# Patient Record
Sex: FEMALE | Race: WHITE | NOT HISPANIC OR LATINO | Employment: OTHER | URBAN - METROPOLITAN AREA
[De-identification: names, ages, dates, MRNs, and addresses within clinical notes are randomized per-mention and may not be internally consistent; named-entity substitution may affect disease eponyms.]

---

## 2017-05-08 ENCOUNTER — HOSPITAL ENCOUNTER (OUTPATIENT)
Dept: FAMILY MEDICINE CLINIC | Facility: CLINIC | Age: 67
Setting detail: SPECIMEN
Discharge: HOME | End: 2017-05-08
Attending: FAMILY MEDICINE | Admitting: FAMILY MEDICINE

## 2017-05-08 LAB
ALBUMIN SERPL-MCNC: 3.5 G/DL (ref 3.5–4.8)
ALBUMIN/GLOB SERPL: 1 {RATIO} (ref 1–1.7)
ALP SERPL-CCNC: 68 IU/L (ref 32–91)
ALT SERPL-CCNC: 21 IU/L (ref 14–54)
ANION GAP SERPL CALC-SCNC: 14.8 MMOL/L (ref 10–20)
AST SERPL-CCNC: 19 IU/L (ref 15–41)
BILIRUB SERPL-MCNC: 0.4 MG/DL (ref 0.3–1.2)
BUN SERPL-MCNC: 20 MG/DL (ref 8–20)
BUN/CREAT SERPL: 20 (ref 5.4–26.2)
CALCIUM SERPL-MCNC: 9.1 MG/DL (ref 8.9–10.3)
CHLORIDE SERPL-SCNC: 104 MMOL/L (ref 101–111)
CHOLEST SERPL-MCNC: 150 MG/DL
CHOLEST/HDLC SERPL: 3.1 {RATIO}
CONV CO2: 27 MMOL/L (ref 22–32)
CONV LDL CHOLESTEROL DIRECT: 85 MG/DL (ref 0–100)
CONV TOTAL PROTEIN: 6.9 G/DL (ref 6.1–7.9)
CREAT UR-MCNC: 1 MG/DL (ref 0.4–1)
GLOBULIN UR ELPH-MCNC: 3.4 G/DL (ref 2.5–3.8)
GLUCOSE SERPL-MCNC: 101 MG/DL (ref 65–99)
HDLC SERPL-MCNC: 48 MG/DL
LDLC/HDLC SERPL: 1.8 {RATIO}
LIPID INTERPRETATION: NORMAL
POTASSIUM SERPL-SCNC: 4.8 MMOL/L (ref 3.6–5.1)
SODIUM SERPL-SCNC: 141 MMOL/L (ref 136–144)
TRIGL SERPL-MCNC: 80 MG/DL
VLDLC SERPL CALC-MCNC: 16.5 MG/DL

## 2017-05-15 ENCOUNTER — HOSPITAL ENCOUNTER (OUTPATIENT)
Dept: MAMMOGRAPHY | Facility: HOSPITAL | Age: 67
Discharge: HOME | End: 2017-05-15
Attending: FAMILY MEDICINE | Admitting: FAMILY MEDICINE

## 2017-11-01 ENCOUNTER — HOSPITAL ENCOUNTER (OUTPATIENT)
Dept: FAMILY MEDICINE CLINIC | Facility: CLINIC | Age: 67
Setting detail: SPECIMEN
Discharge: HOME | End: 2017-11-01
Attending: FAMILY MEDICINE | Admitting: FAMILY MEDICINE

## 2017-11-01 LAB
ALBUMIN SERPL-MCNC: 3.6 G/DL (ref 3.5–4.8)
ALBUMIN/GLOB SERPL: 1 {RATIO} (ref 1–1.7)
ALP SERPL-CCNC: 79 IU/L (ref 32–91)
ALT SERPL-CCNC: 18 IU/L (ref 14–54)
ANION GAP SERPL CALC-SCNC: 13.4 MMOL/L (ref 10–20)
AST SERPL-CCNC: 18 IU/L (ref 15–41)
BASOPHILS # BLD AUTO: 0.1 10*3/UL (ref 0–0.2)
BASOPHILS NFR BLD AUTO: 1 % (ref 0–2)
BILIRUB SERPL-MCNC: 0.4 MG/DL (ref 0.3–1.2)
BUN SERPL-MCNC: 10 MG/DL (ref 8–20)
BUN/CREAT SERPL: 11.1 (ref 5.4–26.2)
CALCIUM SERPL-MCNC: 8.9 MG/DL (ref 8.9–10.3)
CHLORIDE SERPL-SCNC: 103 MMOL/L (ref 101–111)
CHOLEST SERPL-MCNC: 149 MG/DL
CHOLEST/HDLC SERPL: 3.2 {RATIO}
CONV CO2: 27 MMOL/L (ref 22–32)
CONV LDL CHOLESTEROL DIRECT: 95 MG/DL (ref 0–100)
CONV TOTAL PROTEIN: 7.1 G/DL (ref 6.1–7.9)
CREAT UR-MCNC: 0.9 MG/DL (ref 0.4–1)
DIFFERENTIAL METHOD BLD: (no result)
EOSINOPHIL # BLD AUTO: 0.6 10*3/UL (ref 0–0.3)
EOSINOPHIL # BLD AUTO: 6 % (ref 0–3)
ERYTHROCYTE [DISTWIDTH] IN BLOOD BY AUTOMATED COUNT: 16.9 % (ref 11.5–14.5)
GLOBULIN UR ELPH-MCNC: 3.5 G/DL (ref 2.5–3.8)
GLUCOSE SERPL-MCNC: 94 MG/DL (ref 65–99)
HCT VFR BLD AUTO: 34.5 % (ref 35–49)
HDLC SERPL-MCNC: 47 MG/DL
HGB BLD-MCNC: 10.9 G/DL (ref 12–15)
LDLC/HDLC SERPL: 2 {RATIO}
LIPID INTERPRETATION: NORMAL
LYMPHOCYTES # BLD AUTO: 2.5 10*3/UL (ref 0.8–4.8)
LYMPHOCYTES NFR BLD AUTO: 26 % (ref 18–42)
MCH RBC QN AUTO: 25.2 PG (ref 26–32)
MCHC RBC AUTO-ENTMCNC: 31.6 G/DL (ref 32–36)
MCV RBC AUTO: 79.6 FL (ref 80–94)
MONOCYTES # BLD AUTO: 0.7 10*3/UL (ref 0.1–1.3)
MONOCYTES NFR BLD AUTO: 7 % (ref 2–11)
NEUTROPHILS # BLD AUTO: 5.9 10*3/UL (ref 2.3–8.6)
NEUTROPHILS NFR BLD AUTO: 60 % (ref 50–75)
NRBC BLD AUTO-RTO: 0 /100{WBCS}
NRBC/RBC NFR BLD MANUAL: 0 10*3/UL
PLATELET # BLD AUTO: 311 10*3/UL (ref 150–450)
PMV BLD AUTO: 9.1 FL (ref 7.4–10.4)
POTASSIUM SERPL-SCNC: 4.4 MMOL/L (ref 3.6–5.1)
RBC # BLD AUTO: 4.33 10*6/UL (ref 4–5.4)
SODIUM SERPL-SCNC: 139 MMOL/L (ref 136–144)
TRIGL SERPL-MCNC: 93 MG/DL
VLDLC SERPL CALC-MCNC: 7 MG/DL
WBC # BLD AUTO: 9.8 10*3/UL (ref 4.5–11.5)

## 2017-11-10 ENCOUNTER — HOSPITAL ENCOUNTER (OUTPATIENT)
Dept: CARDIOLOGY | Facility: HOSPITAL | Age: 67
Discharge: HOME | End: 2017-11-10
Attending: FAMILY MEDICINE | Admitting: FAMILY MEDICINE

## 2017-11-10 ENCOUNTER — HOSPITAL ENCOUNTER (OUTPATIENT)
Dept: FAMILY MEDICINE CLINIC | Facility: CLINIC | Age: 67
Setting detail: SPECIMEN
Discharge: HOME | End: 2017-11-10
Attending: FAMILY MEDICINE | Admitting: FAMILY MEDICINE

## 2017-11-10 LAB
FOLATE SERPL-MCNC: 21.6 NG/ML (ref 5.9–24.8)
IRON SATN MFR SERPL: 6 % (ref 15–50)
IRON SERPL-MCNC: 24 UG/DL (ref 28–170)
TIBC SERPL-MCNC: 417 UG/DL (ref 228–428)
VIT B12 SERPL-MCNC: 307 PG/ML (ref 180–914)

## 2018-01-05 ENCOUNTER — ON CAMPUS - OUTPATIENT (AMBULATORY)
Dept: URBAN - METROPOLITAN AREA HOSPITAL 2 | Facility: HOSPITAL | Age: 68
End: 2018-01-05

## 2018-01-05 ENCOUNTER — HOSPITAL ENCOUNTER (OUTPATIENT)
Dept: OTHER | Facility: HOSPITAL | Age: 68
Setting detail: SPECIMEN
Discharge: HOME | End: 2018-01-05
Attending: INTERNAL MEDICINE | Admitting: INTERNAL MEDICINE

## 2018-01-05 VITALS
OXYGEN SATURATION: 98 % | SYSTOLIC BLOOD PRESSURE: 132 MMHG | DIASTOLIC BLOOD PRESSURE: 29 MMHG | HEART RATE: 81 BPM | DIASTOLIC BLOOD PRESSURE: 77 MMHG | TEMPERATURE: 97.2 F | HEIGHT: 61 IN | SYSTOLIC BLOOD PRESSURE: 133 MMHG | HEART RATE: 74 BPM | HEART RATE: 82 BPM | SYSTOLIC BLOOD PRESSURE: 142 MMHG | DIASTOLIC BLOOD PRESSURE: 65 MMHG | OXYGEN SATURATION: 95 % | DIASTOLIC BLOOD PRESSURE: 92 MMHG | SYSTOLIC BLOOD PRESSURE: 97 MMHG | RESPIRATION RATE: 20 BRPM | HEART RATE: 70 BPM | HEART RATE: 98 BPM | SYSTOLIC BLOOD PRESSURE: 122 MMHG | SYSTOLIC BLOOD PRESSURE: 115 MMHG | DIASTOLIC BLOOD PRESSURE: 70 MMHG | DIASTOLIC BLOOD PRESSURE: 74 MMHG | SYSTOLIC BLOOD PRESSURE: 179 MMHG | RESPIRATION RATE: 18 BRPM | OXYGEN SATURATION: 96 % | HEART RATE: 84 BPM | HEART RATE: 75 BPM | DIASTOLIC BLOOD PRESSURE: 79 MMHG | SYSTOLIC BLOOD PRESSURE: 92 MMHG | WEIGHT: 215 LBS | OXYGEN SATURATION: 99 %

## 2018-01-05 DIAGNOSIS — D50.0 IRON DEFICIENCY ANEMIA SECONDARY TO BLOOD LOSS (CHRONIC): ICD-10-CM

## 2018-01-05 DIAGNOSIS — K44.9 DIAPHRAGMATIC HERNIA WITHOUT OBSTRUCTION OR GANGRENE: ICD-10-CM

## 2018-01-05 DIAGNOSIS — K57.30 DIVERTICULOSIS OF LARGE INTESTINE WITHOUT PERFORATION OR ABS: ICD-10-CM

## 2018-01-05 DIAGNOSIS — K31.7 POLYP OF STOMACH AND DUODENUM: ICD-10-CM

## 2018-01-05 DIAGNOSIS — Z86.010 PERSONAL HISTORY OF COLONIC POLYPS: ICD-10-CM

## 2018-01-05 PROCEDURE — 43239 EGD BIOPSY SINGLE/MULTIPLE: CPT | Mod: 59 | Performed by: INTERNAL MEDICINE

## 2018-01-05 PROCEDURE — G0105 COLORECTAL SCRN; HI RISK IND: HCPCS | Performed by: INTERNAL MEDICINE

## 2018-01-05 RX ADMIN — PROPOFOL: 10 INJECTION, EMULSION INTRAVENOUS at 08:39

## 2018-01-05 NOTE — SERVICEHPINOTES
FIDEL QUINTEROS  is a  67  female   who presents today for a  EGD-Colonoscopy   for   the indications listed below. The updated Patient Profile was reviewed prior to the procedure, in conjunction with the Physical Exam, including medical conditions, surgical procedures, medications, allergies, family history and social history. See Physical Exam time stamp below for date and time of HPI completion.Pre-operatively, I reviewed the indication(s) for the procedure, the risks of the procedure [including but not limited to: unexpected bleeding possibly requiring hospitalization and/or unplanned repeat procedures, perforation possibly requiring surgical treatment, missed lesions and complications of sedation/MAC (also explained by anesthesia staff)]. I have evaluated the patient for risks associated with the planned anesthesia and the procedure to be performed and find the patient an acceptable candidate for IV sedation.Multiple opportunities were provided for any questions or concerns, and all questions were answered satisfactorily before any anesthesia was administered. We will proceed with the planned procedure.

## 2018-05-10 ENCOUNTER — HOSPITAL ENCOUNTER (OUTPATIENT)
Dept: FAMILY MEDICINE CLINIC | Facility: CLINIC | Age: 68
Setting detail: SPECIMEN
Discharge: HOME | End: 2018-05-10
Attending: FAMILY MEDICINE | Admitting: FAMILY MEDICINE

## 2018-05-10 LAB
ALBUMIN SERPL-MCNC: 3.4 G/DL (ref 3.5–4.8)
ALBUMIN/GLOB SERPL: 1.1 {RATIO} (ref 1–1.7)
ALP SERPL-CCNC: 75 IU/L (ref 32–91)
ALT SERPL-CCNC: 21 IU/L (ref 14–54)
ANION GAP SERPL CALC-SCNC: 14 MMOL/L (ref 10–20)
AST SERPL-CCNC: 20 IU/L (ref 15–41)
BASOPHILS # BLD AUTO: 0.1 10*3/UL (ref 0–0.2)
BASOPHILS NFR BLD AUTO: 1 % (ref 0–2)
BILIRUB SERPL-MCNC: 0.7 MG/DL (ref 0.3–1.2)
BUN SERPL-MCNC: 14 MG/DL (ref 8–20)
BUN/CREAT SERPL: 14 (ref 5.4–26.2)
CALCIUM SERPL-MCNC: 9.1 MG/DL (ref 8.9–10.3)
CHLORIDE SERPL-SCNC: 100 MMOL/L (ref 101–111)
CHOLEST SERPL-MCNC: 140 MG/DL
CHOLEST/HDLC SERPL: 3.5 {RATIO}
CONV CO2: 29 MMOL/L (ref 22–32)
CONV LDL CHOLESTEROL DIRECT: 86 MG/DL (ref 0–100)
CONV TOTAL PROTEIN: 6.5 G/DL (ref 6.1–7.9)
CREAT UR-MCNC: 1 MG/DL (ref 0.4–1)
DIFFERENTIAL METHOD BLD: (no result)
EOSINOPHIL # BLD AUTO: 0.8 10*3/UL (ref 0–0.3)
EOSINOPHIL # BLD AUTO: 9 % (ref 0–3)
ERYTHROCYTE [DISTWIDTH] IN BLOOD BY AUTOMATED COUNT: 16.3 % (ref 11.5–14.5)
GLOBULIN UR ELPH-MCNC: 3.1 G/DL (ref 2.5–3.8)
GLUCOSE SERPL-MCNC: 100 MG/DL (ref 65–99)
HCT VFR BLD AUTO: 36.6 % (ref 35–49)
HDLC SERPL-MCNC: 40 MG/DL
HGB BLD-MCNC: 11.5 G/DL (ref 12–15)
LDLC/HDLC SERPL: 2.2 {RATIO}
LIPID INTERPRETATION: NORMAL
LYMPHOCYTES # BLD AUTO: 2.2 10*3/UL (ref 0.8–4.8)
LYMPHOCYTES NFR BLD AUTO: 24 % (ref 18–42)
MCH RBC QN AUTO: 26.1 PG (ref 26–32)
MCHC RBC AUTO-ENTMCNC: 31.4 G/DL (ref 32–36)
MCV RBC AUTO: 83.2 FL (ref 80–94)
MONOCYTES # BLD AUTO: 0.7 10*3/UL (ref 0.1–1.3)
MONOCYTES NFR BLD AUTO: 8 % (ref 2–11)
NEUTROPHILS # BLD AUTO: 5.2 10*3/UL (ref 2.3–8.6)
NEUTROPHILS NFR BLD AUTO: 58 % (ref 50–75)
NRBC BLD AUTO-RTO: 0 /100{WBCS}
NRBC/RBC NFR BLD MANUAL: 0 10*3/UL
PLATELET # BLD AUTO: 304 10*3/UL (ref 150–450)
PMV BLD AUTO: 10 FL (ref 7.4–10.4)
POTASSIUM SERPL-SCNC: 5 MMOL/L (ref 3.6–5.1)
RBC # BLD AUTO: 4.41 10*6/UL (ref 4–5.4)
SODIUM SERPL-SCNC: 138 MMOL/L (ref 136–144)
TRIGL SERPL-MCNC: 115 MG/DL
VLDLC SERPL CALC-MCNC: 14.6 MG/DL
WBC # BLD AUTO: 9 10*3/UL (ref 4.5–11.5)

## 2018-05-15 ENCOUNTER — CONVERSION ENCOUNTER (OUTPATIENT)
Dept: FAMILY MEDICINE CLINIC | Facility: CLINIC | Age: 68
End: 2018-05-15

## 2018-06-14 ENCOUNTER — HOSPITAL ENCOUNTER (OUTPATIENT)
Dept: MAMMOGRAPHY | Facility: HOSPITAL | Age: 68
Discharge: HOME | End: 2018-06-14
Attending: FAMILY MEDICINE | Admitting: FAMILY MEDICINE

## 2019-06-04 VITALS — SYSTOLIC BLOOD PRESSURE: 144 MMHG | DIASTOLIC BLOOD PRESSURE: 96 MMHG

## 2019-09-12 ENCOUNTER — TRANSCRIBE ORDERS (OUTPATIENT)
Dept: ADMINISTRATIVE | Facility: HOSPITAL | Age: 69
End: 2019-09-12

## 2019-09-12 DIAGNOSIS — Z12.31 SCREENING MAMMOGRAM, ENCOUNTER FOR: Primary | ICD-10-CM

## 2019-09-19 ENCOUNTER — HOSPITAL ENCOUNTER (OUTPATIENT)
Dept: MAMMOGRAPHY | Facility: HOSPITAL | Age: 69
Discharge: HOME | End: 2019-09-19
Admitting: NURSE PRACTITIONER

## 2019-09-19 DIAGNOSIS — Z12.31 SCREENING MAMMOGRAM, ENCOUNTER FOR: ICD-10-CM

## 2019-09-19 PROCEDURE — 77063 BREAST TOMOSYNTHESIS BI: CPT

## 2019-09-19 PROCEDURE — 77067 SCR MAMMO BI INCL CAD: CPT

## 2020-11-08 ENCOUNTER — APPOINTMENT (OUTPATIENT)
Dept: CT IMAGING | Facility: HOSPITAL | Age: 70
End: 2020-11-08

## 2020-11-08 ENCOUNTER — HOSPITAL ENCOUNTER (INPATIENT)
Facility: HOSPITAL | Age: 70
LOS: 3 days | Discharge: HOME | End: 2020-11-11
Attending: EMERGENCY MEDICINE | Admitting: INTERNAL MEDICINE

## 2020-11-08 ENCOUNTER — APPOINTMENT (OUTPATIENT)
Dept: GENERAL RADIOLOGY | Facility: HOSPITAL | Age: 70
End: 2020-11-08

## 2020-11-08 DIAGNOSIS — U07.1 COVID-19: ICD-10-CM

## 2020-11-08 DIAGNOSIS — J18.9 PNEUMONIA OF BOTH LUNGS DUE TO INFECTIOUS ORGANISM, UNSPECIFIED PART OF LUNG: ICD-10-CM

## 2020-11-08 DIAGNOSIS — R06.03 ACUTE RESPIRATORY DISTRESS: Primary | ICD-10-CM

## 2020-11-08 DIAGNOSIS — J44.1 CHRONIC OBSTRUCTIVE PULMONARY DISEASE WITH ACUTE EXACERBATION (HCC): ICD-10-CM

## 2020-11-08 LAB
ALBUMIN SERPL-MCNC: 3.6 G/DL (ref 3.5–5.2)
ALBUMIN/GLOB SERPL: 1.2 G/DL
ALP SERPL-CCNC: 67 U/L (ref 39–117)
ALT SERPL W P-5'-P-CCNC: 34 U/L (ref 1–33)
AMORPH URATE CRY URNS QL MICRO: ABNORMAL /HPF
ANION GAP SERPL CALCULATED.3IONS-SCNC: 15 MMOL/L (ref 5–15)
AST SERPL-CCNC: 35 U/L (ref 1–32)
BACTERIA UR QL AUTO: ABNORMAL /HPF
BASOPHILS # BLD AUTO: 0 10*3/MM3 (ref 0–0.2)
BASOPHILS NFR BLD AUTO: 0.8 % (ref 0–1.5)
BILIRUB SERPL-MCNC: 0.4 MG/DL (ref 0–1.2)
BILIRUB UR QL STRIP: ABNORMAL
BUN SERPL-MCNC: 25 MG/DL (ref 8–23)
BUN/CREAT SERPL: 22.1 (ref 7–25)
CALCIUM SPEC-SCNC: 8.7 MG/DL (ref 8.6–10.5)
CHLORIDE SERPL-SCNC: 95 MMOL/L (ref 98–107)
CLARITY UR: ABNORMAL
CO2 SERPL-SCNC: 24 MMOL/L (ref 22–29)
COLOR UR: ABNORMAL
CREAT SERPL-MCNC: 1.13 MG/DL (ref 0.57–1)
D-LACTATE SERPL-SCNC: 1 MMOL/L (ref 0.5–2)
DEPRECATED RDW RBC AUTO: 44.6 FL (ref 37–54)
EOSINOPHIL # BLD AUTO: 0 10*3/MM3 (ref 0–0.4)
EOSINOPHIL NFR BLD AUTO: 0.2 % (ref 0.3–6.2)
ERYTHROCYTE [DISTWIDTH] IN BLOOD BY AUTOMATED COUNT: 15.8 % (ref 12.3–15.4)
GFR SERPL CREATININE-BSD FRML MDRD: 47 ML/MIN/1.73
GLOBULIN UR ELPH-MCNC: 2.9 GM/DL
GLUCOSE SERPL-MCNC: 89 MG/DL (ref 65–99)
GLUCOSE UR STRIP-MCNC: NEGATIVE MG/DL
HCT VFR BLD AUTO: 39.3 % (ref 34–46.6)
HGB BLD-MCNC: 12.9 G/DL (ref 12–15.9)
HGB UR QL STRIP.AUTO: NEGATIVE
HYALINE CASTS UR QL AUTO: ABNORMAL /LPF
KETONES UR QL STRIP: ABNORMAL
LEUKOCYTE ESTERASE UR QL STRIP.AUTO: NEGATIVE
LIPASE SERPL-CCNC: 100 U/L (ref 13–60)
LYMPHOCYTES # BLD AUTO: 1.1 10*3/MM3 (ref 0.7–3.1)
LYMPHOCYTES NFR BLD AUTO: 24.4 % (ref 19.6–45.3)
MCH RBC QN AUTO: 27.1 PG (ref 26.6–33)
MCHC RBC AUTO-ENTMCNC: 32.9 G/DL (ref 31.5–35.7)
MCV RBC AUTO: 82.2 FL (ref 79–97)
MONOCYTES # BLD AUTO: 0.4 10*3/MM3 (ref 0.1–0.9)
MONOCYTES NFR BLD AUTO: 9.1 % (ref 5–12)
NEUTROPHILS NFR BLD AUTO: 3 10*3/MM3 (ref 1.7–7)
NEUTROPHILS NFR BLD AUTO: 65.5 % (ref 42.7–76)
NITRITE UR QL STRIP: NEGATIVE
NRBC BLD AUTO-RTO: 0 /100 WBC (ref 0–0.2)
PH UR STRIP.AUTO: 5.5 [PH] (ref 5–8)
PLATELET # BLD AUTO: 183 10*3/MM3 (ref 140–450)
PMV BLD AUTO: 9.3 FL (ref 6–12)
POTASSIUM SERPL-SCNC: 3.7 MMOL/L (ref 3.5–5.2)
PROT SERPL-MCNC: 6.5 G/DL (ref 6–8.5)
PROT UR QL STRIP: ABNORMAL
RBC # BLD AUTO: 4.78 10*6/MM3 (ref 3.77–5.28)
RBC # UR: ABNORMAL /HPF
REF LAB TEST METHOD: ABNORMAL
RENAL EPI CELLS #/AREA URNS HPF: ABNORMAL /HPF
SODIUM SERPL-SCNC: 134 MMOL/L (ref 136–145)
SP GR UR STRIP: 1.02 (ref 1–1.03)
SQUAMOUS #/AREA URNS HPF: ABNORMAL /HPF
TROPONIN T SERPL-MCNC: <0.01 NG/ML (ref 0–0.03)
UROBILINOGEN UR QL STRIP: ABNORMAL
WBC # BLD AUTO: 4.6 10*3/MM3 (ref 3.4–10.8)
WBC UR QL AUTO: ABNORMAL /HPF
WHOLE BLOOD HOLD SPECIMEN: NORMAL

## 2020-11-08 PROCEDURE — 93005 ELECTROCARDIOGRAM TRACING: CPT | Performed by: EMERGENCY MEDICINE

## 2020-11-08 PROCEDURE — 83605 ASSAY OF LACTIC ACID: CPT

## 2020-11-08 PROCEDURE — P9612 CATHETERIZE FOR URINE SPEC: HCPCS

## 2020-11-08 PROCEDURE — 25010000002 ONDANSETRON PER 1 MG: Performed by: EMERGENCY MEDICINE

## 2020-11-08 PROCEDURE — 99222 1ST HOSP IP/OBS MODERATE 55: CPT | Performed by: PHYSICIAN ASSISTANT

## 2020-11-08 PROCEDURE — 81001 URINALYSIS AUTO W/SCOPE: CPT | Performed by: EMERGENCY MEDICINE

## 2020-11-08 PROCEDURE — 80053 COMPREHEN METABOLIC PANEL: CPT | Performed by: EMERGENCY MEDICINE

## 2020-11-08 PROCEDURE — 74176 CT ABD & PELVIS W/O CONTRAST: CPT

## 2020-11-08 PROCEDURE — 99285 EMERGENCY DEPT VISIT HI MDM: CPT

## 2020-11-08 PROCEDURE — 71045 X-RAY EXAM CHEST 1 VIEW: CPT

## 2020-11-08 PROCEDURE — 85025 COMPLETE CBC W/AUTO DIFF WBC: CPT | Performed by: EMERGENCY MEDICINE

## 2020-11-08 PROCEDURE — 84484 ASSAY OF TROPONIN QUANT: CPT | Performed by: EMERGENCY MEDICINE

## 2020-11-08 PROCEDURE — 83690 ASSAY OF LIPASE: CPT | Performed by: EMERGENCY MEDICINE

## 2020-11-08 PROCEDURE — 25010000002 AZITHROMYCIN PER 500 MG: Performed by: EMERGENCY MEDICINE

## 2020-11-08 PROCEDURE — 25010000002 DEXAMETHASONE SODIUM PHOSPHATE 10 MG/ML SOLUTION: Performed by: EMERGENCY MEDICINE

## 2020-11-08 PROCEDURE — 87040 BLOOD CULTURE FOR BACTERIA: CPT | Performed by: EMERGENCY MEDICINE

## 2020-11-08 RX ORDER — INSULIN LISPRO 100 [IU]/ML
0-9 INJECTION, SOLUTION INTRAVENOUS; SUBCUTANEOUS
Status: DISCONTINUED | OUTPATIENT
Start: 2020-11-09 | End: 2020-11-11 | Stop reason: HOSPADM

## 2020-11-08 RX ORDER — CLONIDINE HYDROCHLORIDE 0.1 MG/1
0.1 TABLET ORAL DAILY
Status: DISCONTINUED | OUTPATIENT
Start: 2020-11-09 | End: 2020-11-09

## 2020-11-08 RX ORDER — CLONIDINE HYDROCHLORIDE 0.1 MG/1
0.1 TABLET ORAL DAILY
COMMUNITY

## 2020-11-08 RX ORDER — SODIUM CHLORIDE 9 MG/ML
50 INJECTION, SOLUTION INTRAVENOUS CONTINUOUS
Status: DISCONTINUED | OUTPATIENT
Start: 2020-11-08 | End: 2020-11-09

## 2020-11-08 RX ORDER — ALUMINA, MAGNESIA, AND SIMETHICONE 2400; 2400; 240 MG/30ML; MG/30ML; MG/30ML
15 SUSPENSION ORAL EVERY 6 HOURS PRN
Status: DISCONTINUED | OUTPATIENT
Start: 2020-11-08 | End: 2020-11-11 | Stop reason: HOSPADM

## 2020-11-08 RX ORDER — HYDROCHLOROTHIAZIDE 25 MG/1
25 TABLET ORAL DAILY
Status: DISCONTINUED | OUTPATIENT
Start: 2020-11-09 | End: 2020-11-09

## 2020-11-08 RX ORDER — GUAIFENESIN 600 MG/1
1200 TABLET, EXTENDED RELEASE ORAL EVERY 12 HOURS SCHEDULED
Status: DISCONTINUED | OUTPATIENT
Start: 2020-11-09 | End: 2020-11-11 | Stop reason: HOSPADM

## 2020-11-08 RX ORDER — ASPIRIN 81 MG/1
81 TABLET, CHEWABLE ORAL DAILY
Status: DISCONTINUED | OUTPATIENT
Start: 2020-11-09 | End: 2020-11-11 | Stop reason: HOSPADM

## 2020-11-08 RX ORDER — NICOTINE POLACRILEX 4 MG
15 LOZENGE BUCCAL
Status: DISCONTINUED | OUTPATIENT
Start: 2020-11-08 | End: 2020-11-11 | Stop reason: HOSPADM

## 2020-11-08 RX ORDER — ONDANSETRON 4 MG/1
4 TABLET, ORALLY DISINTEGRATING ORAL EVERY 8 HOURS PRN
COMMUNITY

## 2020-11-08 RX ORDER — ATORVASTATIN CALCIUM 40 MG/1
40 TABLET, FILM COATED ORAL DAILY
Status: DISCONTINUED | OUTPATIENT
Start: 2020-11-09 | End: 2020-11-11 | Stop reason: HOSPADM

## 2020-11-08 RX ORDER — BUDESONIDE AND FORMOTEROL FUMARATE DIHYDRATE 160; 4.5 UG/1; UG/1
2 AEROSOL RESPIRATORY (INHALATION)
Status: DISCONTINUED | OUTPATIENT
Start: 2020-11-08 | End: 2020-11-11 | Stop reason: HOSPADM

## 2020-11-08 RX ORDER — ONDANSETRON 4 MG/1
4 TABLET, ORALLY DISINTEGRATING ORAL EVERY 8 HOURS PRN
Status: DISCONTINUED | OUTPATIENT
Start: 2020-11-08 | End: 2020-11-11 | Stop reason: HOSPADM

## 2020-11-08 RX ORDER — ACETAMINOPHEN 325 MG/1
650 TABLET ORAL EVERY 4 HOURS PRN
Status: DISCONTINUED | OUTPATIENT
Start: 2020-11-08 | End: 2020-11-11 | Stop reason: HOSPADM

## 2020-11-08 RX ORDER — ONDANSETRON 2 MG/ML
4 INJECTION INTRAMUSCULAR; INTRAVENOUS ONCE
Status: COMPLETED | OUTPATIENT
Start: 2020-11-08 | End: 2020-11-08

## 2020-11-08 RX ORDER — SODIUM CHLORIDE 0.9 % (FLUSH) 0.9 %
10 SYRINGE (ML) INJECTION AS NEEDED
Status: DISCONTINUED | OUTPATIENT
Start: 2020-11-08 | End: 2020-11-11 | Stop reason: HOSPADM

## 2020-11-08 RX ORDER — IPRATROPIUM BROMIDE AND ALBUTEROL SULFATE 2.5; .5 MG/3ML; MG/3ML
3 SOLUTION RESPIRATORY (INHALATION)
Status: DISCONTINUED | OUTPATIENT
Start: 2020-11-08 | End: 2020-11-09 | Stop reason: ALTCHOICE

## 2020-11-08 RX ORDER — ONDANSETRON 2 MG/ML
4 INJECTION INTRAMUSCULAR; INTRAVENOUS EVERY 6 HOURS PRN
Status: DISCONTINUED | OUTPATIENT
Start: 2020-11-08 | End: 2020-11-11 | Stop reason: HOSPADM

## 2020-11-08 RX ORDER — MAGNESIUM SULFATE 1 G/100ML
1 INJECTION INTRAVENOUS AS NEEDED
Status: DISCONTINUED | OUTPATIENT
Start: 2020-11-08 | End: 2020-11-11 | Stop reason: HOSPADM

## 2020-11-08 RX ORDER — BISACODYL 10 MG
10 SUPPOSITORY, RECTAL RECTAL DAILY PRN
Status: DISCONTINUED | OUTPATIENT
Start: 2020-11-08 | End: 2020-11-11 | Stop reason: HOSPADM

## 2020-11-08 RX ORDER — ONDANSETRON 4 MG/1
4 TABLET, FILM COATED ORAL EVERY 6 HOURS PRN
Status: DISCONTINUED | OUTPATIENT
Start: 2020-11-08 | End: 2020-11-11 | Stop reason: HOSPADM

## 2020-11-08 RX ORDER — METFORMIN HYDROCHLORIDE 500 MG/1
1000 TABLET, EXTENDED RELEASE ORAL
COMMUNITY

## 2020-11-08 RX ORDER — MAGNESIUM SULFATE HEPTAHYDRATE 40 MG/ML
2 INJECTION, SOLUTION INTRAVENOUS AS NEEDED
Status: DISCONTINUED | OUTPATIENT
Start: 2020-11-08 | End: 2020-11-11 | Stop reason: HOSPADM

## 2020-11-08 RX ORDER — DEXAMETHASONE SODIUM PHOSPHATE 10 MG/ML
6 INJECTION, SOLUTION INTRAMUSCULAR; INTRAVENOUS ONCE
Status: COMPLETED | OUTPATIENT
Start: 2020-11-08 | End: 2020-11-08

## 2020-11-08 RX ORDER — OMEPRAZOLE 40 MG/1
40 CAPSULE, DELAYED RELEASE ORAL DAILY
COMMUNITY

## 2020-11-08 RX ORDER — DEXTROSE MONOHYDRATE 25 G/50ML
25 INJECTION, SOLUTION INTRAVENOUS
Status: DISCONTINUED | OUTPATIENT
Start: 2020-11-08 | End: 2020-11-11 | Stop reason: HOSPADM

## 2020-11-08 RX ORDER — HYDROCHLOROTHIAZIDE 25 MG/1
25 TABLET ORAL DAILY
COMMUNITY
End: 2020-11-11 | Stop reason: HOSPADM

## 2020-11-08 RX ORDER — CANDESARTAN 32 MG/1
32 TABLET ORAL DAILY
COMMUNITY
End: 2020-11-11 | Stop reason: HOSPADM

## 2020-11-08 RX ORDER — ACETAMINOPHEN 650 MG/1
650 SUPPOSITORY RECTAL EVERY 4 HOURS PRN
Status: DISCONTINUED | OUTPATIENT
Start: 2020-11-08 | End: 2020-11-11 | Stop reason: HOSPADM

## 2020-11-08 RX ORDER — PANTOPRAZOLE SODIUM 40 MG/1
40 TABLET, DELAYED RELEASE ORAL
Status: DISCONTINUED | OUTPATIENT
Start: 2020-11-09 | End: 2020-11-11 | Stop reason: HOSPADM

## 2020-11-08 RX ORDER — ATORVASTATIN CALCIUM 40 MG/1
40 TABLET, FILM COATED ORAL DAILY
COMMUNITY

## 2020-11-08 RX ORDER — BUDESONIDE AND FORMOTEROL FUMARATE DIHYDRATE 160; 4.5 UG/1; UG/1
2 AEROSOL RESPIRATORY (INHALATION)
COMMUNITY

## 2020-11-08 RX ORDER — SODIUM CHLORIDE 0.9 % (FLUSH) 0.9 %
10 SYRINGE (ML) INJECTION EVERY 12 HOURS SCHEDULED
Status: DISCONTINUED | OUTPATIENT
Start: 2020-11-08 | End: 2020-11-11 | Stop reason: HOSPADM

## 2020-11-08 RX ORDER — INSULIN LISPRO 100 [IU]/ML
0-9 INJECTION, SOLUTION INTRAVENOUS; SUBCUTANEOUS AS NEEDED
Status: DISCONTINUED | OUTPATIENT
Start: 2020-11-08 | End: 2020-11-11 | Stop reason: HOSPADM

## 2020-11-08 RX ORDER — CHOLECALCIFEROL (VITAMIN D3) 125 MCG
5 CAPSULE ORAL NIGHTLY PRN
Status: DISCONTINUED | OUTPATIENT
Start: 2020-11-08 | End: 2020-11-11 | Stop reason: HOSPADM

## 2020-11-08 RX ORDER — SODIUM CHLORIDE 9 MG/ML
75 INJECTION, SOLUTION INTRAVENOUS CONTINUOUS
Status: DISCONTINUED | OUTPATIENT
Start: 2020-11-08 | End: 2020-11-09

## 2020-11-08 RX ORDER — ACETAMINOPHEN 160 MG/5ML
650 SOLUTION ORAL EVERY 4 HOURS PRN
Status: DISCONTINUED | OUTPATIENT
Start: 2020-11-08 | End: 2020-11-11 | Stop reason: HOSPADM

## 2020-11-08 RX ORDER — AZITHROMYCIN 250 MG/1
250 TABLET, FILM COATED ORAL DAILY
COMMUNITY
Start: 2020-11-04 | End: 2020-11-11 | Stop reason: HOSPADM

## 2020-11-08 RX ORDER — POTASSIUM CHLORIDE 20 MEQ/1
40 TABLET, EXTENDED RELEASE ORAL AS NEEDED
Status: DISCONTINUED | OUTPATIENT
Start: 2020-11-08 | End: 2020-11-11 | Stop reason: HOSPADM

## 2020-11-08 RX ORDER — ALBUTEROL SULFATE 90 UG/1
2 AEROSOL, METERED RESPIRATORY (INHALATION)
Status: DISCONTINUED | OUTPATIENT
Start: 2020-11-08 | End: 2020-11-11 | Stop reason: HOSPADM

## 2020-11-08 RX ORDER — BENZONATATE 100 MG/1
200 CAPSULE ORAL 3 TIMES DAILY PRN
Status: DISCONTINUED | OUTPATIENT
Start: 2020-11-08 | End: 2020-11-11 | Stop reason: HOSPADM

## 2020-11-08 RX ORDER — DEXAMETHASONE SODIUM PHOSPHATE 10 MG/ML
6 INJECTION, SOLUTION INTRAMUSCULAR; INTRAVENOUS DAILY
Status: DISCONTINUED | OUTPATIENT
Start: 2020-11-09 | End: 2020-11-10

## 2020-11-08 RX ORDER — ASPIRIN 81 MG/1
81 TABLET, CHEWABLE ORAL DAILY
COMMUNITY

## 2020-11-08 RX ORDER — NITROGLYCERIN 0.4 MG/1
0.4 TABLET SUBLINGUAL
Status: DISCONTINUED | OUTPATIENT
Start: 2020-11-08 | End: 2020-11-11 | Stop reason: HOSPADM

## 2020-11-08 RX ORDER — AZITHROMYCIN 250 MG/1
500 TABLET, FILM COATED ORAL EVERY 24 HOURS
Status: DISCONTINUED | OUTPATIENT
Start: 2020-11-09 | End: 2020-11-11 | Stop reason: HOSPADM

## 2020-11-08 RX ORDER — VALSARTAN 80 MG/1
320 TABLET ORAL
Status: DISCONTINUED | OUTPATIENT
Start: 2020-11-09 | End: 2020-11-09

## 2020-11-08 RX ADMIN — DEXAMETHASONE SODIUM PHOSPHATE 6 MG: 10 INJECTION, SOLUTION INTRAMUSCULAR; INTRAVENOUS at 19:28

## 2020-11-08 RX ADMIN — SODIUM CHLORIDE 500 MG: 900 INJECTION, SOLUTION INTRAVENOUS at 19:28

## 2020-11-08 RX ADMIN — ONDANSETRON HYDROCHLORIDE 4 MG: 2 SOLUTION INTRAMUSCULAR; INTRAVENOUS at 13:46

## 2020-11-08 RX ADMIN — SODIUM CHLORIDE 1000 ML: 9 INJECTION, SOLUTION INTRAVENOUS at 13:46

## 2020-11-08 RX ADMIN — SODIUM CHLORIDE 1000 ML: 9 INJECTION, SOLUTION INTRAVENOUS at 18:06

## 2020-11-08 NOTE — ED NOTES
Pt c/o n/v/d, dizziness, cough, SOA since Monday. Seen at [facility] ER Wednesday and diagnosed with COVID. Pt states her symptoms are getting worse.     Armida Lopez RN  11/08/20 1524

## 2020-11-08 NOTE — ED NOTES
Pt c/o feeling dizzy then reported an urge to defecate. Pt became unresponsive with eyes open, BP dropped to 72/51 and HR decreased to 52. Pt was diaphoretic and nauseous. Symptoms lasted 20 seconds then pt able to respond verbally and BP then increased to 92/52. HR 80s NSR. IVF infusing, EKG obtained at bedside.      Armida Lopez RN  11/08/20 3568       Armida Lopez RN  11/08/20 3819

## 2020-11-08 NOTE — ED PROVIDER NOTES
Subjective   Complaint shortness of breath nausea vomiting cannot hold anything down    History of present illness this is a 70-year-old female with history of COPD states that she has been feeling fatigued and had a cough and congestion throughout the week she tested positive for COVID-19 on Wednesday and she has been vomiting the last couple days and cannot hold anything down.  She has some mild shortness of breath.  No chest pain neck arm or jaw pain symptoms are moderate worse with any activity and better with rest and ongoing this week but vomiting last couple days.  No one at home with similar illness.  No urinary problems.          Review of Systems   Constitutional: Positive for chills. Negative for fever.   HENT: Positive for congestion. Negative for sinus pressure.    Eyes: Negative for photophobia and visual disturbance.   Respiratory: Positive for cough and chest tightness.    Cardiovascular: Negative for chest pain and leg swelling.   Gastrointestinal: Positive for vomiting. Negative for abdominal pain.   Endocrine: Negative for cold intolerance and heat intolerance.   Genitourinary: Negative for difficulty urinating and dysuria.   Musculoskeletal: Negative for arthralgias and back pain.   Skin: Negative for color change and pallor.   Neurological: Positive for weakness. Negative for dizziness and light-headedness.   Psychiatric/Behavioral: Negative for agitation and behavioral problems.       No past medical history on file.  COPD diabetes  Allergies   Allergen Reactions   • Sulfa Antibiotics Anaphylaxis       No past surgical history on file.    No family history on file.    Social History     Socioeconomic History   • Marital status:      Spouse name: Not on file   • Number of children: Not on file   • Years of education: Not on file   • Highest education level: Not on file     Prior to Admission medications    Medication Sig Start Date End Date Taking? Authorizing Provider   aspirin 81 MG  chewable tablet Chew 81 mg Daily.   Yes J Luis Dash MD   atorvastatin (LIPITOR) 40 MG tablet Take 40 mg by mouth Daily.   Yes J Luis Dash MD   azithromycin (ZITHROMAX) 250 MG tablet Take 250 mg by mouth Daily. For 5 days 11/4/20 11/9/20 Yes J Luis Dash MD   budesonide-formoterol (SYMBICORT) 160-4.5 MCG/ACT inhaler Inhale 2 puffs 2 (Two) Times a Day.   Yes J Luis Dash MD   candesartan (ATACAND) 32 MG tablet Take 32 mg by mouth Daily.   Yes J Luis Dash MD   cloNIDine (CATAPRES) 0.1 MG tablet Take 0.1 mg by mouth Daily.   Yes J Luis Dash MD   hydroCHLOROthiazide (HYDRODIURIL) 25 MG tablet Take 25 mg by mouth Daily.   Yes J Luis Dash MD   metFORMIN ER (GLUCOPHAGE-XR) 500 MG 24 hr tablet Take 1,000 mg by mouth Daily With Breakfast.   Yes J Luis Dash MD   omeprazole (priLOSEC) 40 MG capsule Take 40 mg by mouth Daily.   Yes J Luis Dash MD   ondansetron ODT (ZOFRAN-ODT) 4 MG disintegrating tablet Place 4 mg on the tongue Every 8 (Eight) Hours As Needed for Nausea or Vomiting.   Yes J Luis Dash MD           Objective   Physical Exam  70-year-old awake alert no acute distress but sats are 91% on room air HEENT extraocular static pupils equal and reactive mouth is clear neck supple no adenopathy no JVD no bruits lungs scattered rhonchi and wheezes no retractions heart regular without murmur abdomen soft without tenderness no pulsatile masses extremities pulses are equal throughout upper and lower extremities no edema cords or Homans' sign or evidence of DVT.  Patient's awake alert follows commands motor strength normal without focal weakness  Procedures           ED Course      Results for orders placed or performed during the hospital encounter of 11/08/20   Comprehensive Metabolic Panel    Specimen: Arm, Left; Blood   Result Value Ref Range    Glucose 89 65 - 99 mg/dL    BUN 25 (H) 8 - 23 mg/dL    Creatinine 1.13 (H) 0.57 -  1.00 mg/dL    Sodium 134 (L) 136 - 145 mmol/L    Potassium 3.7 3.5 - 5.2 mmol/L    Chloride 95 (L) 98 - 107 mmol/L    CO2 24.0 22.0 - 29.0 mmol/L    Calcium 8.7 8.6 - 10.5 mg/dL    Total Protein 6.5 6.0 - 8.5 g/dL    Albumin 3.60 3.50 - 5.20 g/dL    ALT (SGPT) 34 (H) 1 - 33 U/L    AST (SGOT) 35 (H) 1 - 32 U/L    Alkaline Phosphatase 67 39 - 117 U/L    Total Bilirubin 0.4 0.0 - 1.2 mg/dL    eGFR Non African Amer 47 (L) >60 mL/min/1.73    Globulin 2.9 gm/dL    A/G Ratio 1.2 g/dL    BUN/Creatinine Ratio 22.1 7.0 - 25.0    Anion Gap 15.0 5.0 - 15.0 mmol/L   Lipase    Specimen: Arm, Left; Blood   Result Value Ref Range    Lipase 100 (H) 13 - 60 U/L   Urinalysis With Microscopic If Indicated (No Culture) - Urine, Catheter    Specimen: Urine, Catheter   Result Value Ref Range    Color, UA Dark Yellow (A) Yellow, Straw    Appearance, UA Cloudy (A) Clear    pH, UA 5.5 5.0 - 8.0    Specific Gravity, UA 1.023 1.005 - 1.030    Glucose, UA Negative Negative    Ketones, UA Trace (A) Negative    Bilirubin, UA Small (1+) (A) Negative    Blood, UA Negative Negative    Protein,  mg/dL (2+) (A) Negative    Leuk Esterase, UA Negative Negative    Nitrite, UA Negative Negative    Urobilinogen, UA 0.2 E.U./dL 0.2 - 1.0 E.U./dL   Troponin    Specimen: Arm, Left; Blood   Result Value Ref Range    Troponin T <0.010 0.000 - 0.030 ng/mL   CBC Auto Differential    Specimen: Arm, Left; Blood   Result Value Ref Range    WBC 4.60 3.40 - 10.80 10*3/mm3    RBC 4.78 3.77 - 5.28 10*6/mm3    Hemoglobin 12.9 12.0 - 15.9 g/dL    Hematocrit 39.3 34.0 - 46.6 %    MCV 82.2 79.0 - 97.0 fL    MCH 27.1 26.6 - 33.0 pg    MCHC 32.9 31.5 - 35.7 g/dL    RDW 15.8 (H) 12.3 - 15.4 %    RDW-SD 44.6 37.0 - 54.0 fl    MPV 9.3 6.0 - 12.0 fL    Platelets 183 140 - 450 10*3/mm3    Neutrophil % 65.5 42.7 - 76.0 %    Lymphocyte % 24.4 19.6 - 45.3 %    Monocyte % 9.1 5.0 - 12.0 %    Eosinophil % 0.2 (L) 0.3 - 6.2 %    Basophil % 0.8 0.0 - 1.5 %    Neutrophils, Absolute  3.00 1.70 - 7.00 10*3/mm3    Lymphocytes, Absolute 1.10 0.70 - 3.10 10*3/mm3    Monocytes, Absolute 0.40 0.10 - 0.90 10*3/mm3    Eosinophils, Absolute 0.00 0.00 - 0.40 10*3/mm3    Basophils, Absolute 0.00 0.00 - 0.20 10*3/mm3    nRBC 0.0 0.0 - 0.2 /100 WBC   Urinalysis, Microscopic Only - Urine, Catheter    Specimen: Urine, Catheter   Result Value Ref Range    RBC, UA 0-2 (A) None Seen /HPF    WBC, UA 6-12 (A) None Seen /HPF    Bacteria, UA None Seen None Seen /HPF    Squamous Epithelial Cells, UA 0-2 None Seen, 0-2 /HPF    Renal Epithelial Cells, UA 3-6 (A) 0 - 2 /HPF    Hyaline Casts, UA 13-20 None Seen /LPF    Amorphous Crystals, UA Small/1+ None Seen /HPF    Methodology Manual Light Microscopy    POC Lactate    Specimen: Blood   Result Value Ref Range    Lactate 1.0 0.5 - 2.0 mmol/L   ECG 12 Lead   Result Value Ref Range    QT Interval 358 ms   Light Blue Top   Result Value Ref Range    Extra Tube hold for add-on      Ct Abdomen Pelvis Without Contrast    Result Date: 11/8/2020  1.No acute process identified within abdomen/pelivs. 2.Within the visualized lung bases are a couple scattered groundglass densities, suggesting a mild infectious or inflammatory process.    Electronically Signed By-DR. Rodney Escudero MD On:11/8/2020 4:01 PM This report was finalized on [id number] by DR. Rodney Escudero MD.    Xr Chest 1 View    Result Date: 11/8/2020  No acute cardiopulmonary process identified.  Electronically Signed By-DR. Rodney Escudero MD On:11/8/2020 1:43 PM This report was finalized on [id number] by DR. Rodney Escudero MD.    Medications   sodium chloride 0.9 % flush 10 mL (has no administration in time range)   sodium chloride 0.9 % bolus 1,000 mL (1,000 mL Intravenous New Bag 11/8/20 1806)   sodium chloride 0.9 % bolus 1,000 mL (0 mL Intravenous Stopped 11/8/20 1518)   ondansetron (ZOFRAN) injection 4 mg (4 mg Intravenous Given 11/8/20 1346)            EKG my interpretation normal sinus rhythm rate  84 normal axis hypertrophy  normal EKG                                MDM  Number of Diagnoses or Management Options  Acute respiratory distress:   Chronic obstructive pulmonary disease with acute exacerbation (CMS/HCC):   COVID-19:   Pneumonia of both lungs due to infectious organism, unspecified part of lung:   Diagnosis management comments: Medical decision making.  Patient IV established placed on monitor of saline Zofran 4 mg IV.  Patient had the above exam evaluation chest x-ray unremarkable EKG without acute findings CT abdomen pelvis showed no acute intra-abdominal process but showed groundglass opacities in the lower lobes.  CBC unremarkable lactate was 1 urine was unremarkable troponin negative chemistries BUN of 25 creatinine 1.13.  The patient was given Decadron 6 mg IV Zithromax 500 mg IV.  Patient sats about 91% on room air she has COPD she is vomiting cannot hold anything down and she has shortness of breath and sats are 91% and some pneumonia and COVID-19.  Patient made aware the findings a hospitalist notified the patient be placed in hospital for further care stable unremarkable improved ER course.  Patient examined appropriate PPE per hospital protocol       Amount and/or Complexity of Data Reviewed  Clinical lab tests: reviewed  Tests in the radiology section of CPT®: reviewed  Tests in the medicine section of CPT®: reviewed        Final diagnoses:   Acute respiratory distress   Chronic obstructive pulmonary disease with acute exacerbation (CMS/HCC)   Pneumonia of both lungs due to infectious organism, unspecified part of lung   COVID-19            Augustus Frey MD  11/08/20 1902       Augustus Frey MD  11/08/20 1923

## 2020-11-09 PROBLEM — E66.9 OBESITY (BMI 30-39.9): Chronic | Status: ACTIVE | Noted: 2020-11-09

## 2020-11-09 PROBLEM — K21.9 GASTROESOPHAGEAL REFLUX DISEASE: Status: ACTIVE | Noted: 2020-11-09

## 2020-11-09 PROBLEM — E11.9 DIABETES MELLITUS (HCC): Status: ACTIVE | Noted: 2018-07-02

## 2020-11-09 PROBLEM — N18.30 CKD (CHRONIC KIDNEY DISEASE), STAGE III (HCC): Chronic | Status: ACTIVE | Noted: 2020-11-09

## 2020-11-09 PROBLEM — E78.5 HYPERLIPIDEMIA: Chronic | Status: ACTIVE | Noted: 2018-07-02

## 2020-11-09 PROBLEM — I95.9 HYPOTENSION: Status: ACTIVE | Noted: 2020-11-09

## 2020-11-09 PROBLEM — I10 HYPERTENSION: Status: ACTIVE | Noted: 2018-07-02

## 2020-11-09 PROBLEM — I25.10 CAD (CORONARY ARTERY DISEASE): Chronic | Status: ACTIVE | Noted: 2020-11-09

## 2020-11-09 PROBLEM — E78.5 HYPERLIPIDEMIA: Status: ACTIVE | Noted: 2018-07-02

## 2020-11-09 PROBLEM — K21.9 GASTROESOPHAGEAL REFLUX DISEASE: Chronic | Status: ACTIVE | Noted: 2020-11-09

## 2020-11-09 PROBLEM — J45.909 ASTHMA: Status: ACTIVE | Noted: 2020-11-09

## 2020-11-09 PROBLEM — G25.81 RESTLESS LEGS: Status: ACTIVE | Noted: 2020-10-19

## 2020-11-09 PROBLEM — U07.1 COVID-19 VIRUS INFECTION: Status: ACTIVE | Noted: 2020-11-09

## 2020-11-09 PROBLEM — D64.9 ANEMIA: Status: ACTIVE | Noted: 2017-11-02

## 2020-11-09 PROBLEM — R11.2 NAUSEA AND VOMITING: Status: ACTIVE | Noted: 2020-11-09

## 2020-11-09 PROBLEM — I10 HYPERTENSION: Chronic | Status: ACTIVE | Noted: 2018-07-02

## 2020-11-09 PROBLEM — R74.8 ELEVATED LIPASE: Status: ACTIVE | Noted: 2020-11-09

## 2020-11-09 PROBLEM — E11.9 DIABETES MELLITUS (HCC): Chronic | Status: ACTIVE | Noted: 2018-07-02

## 2020-11-09 LAB
ABO GROUP BLD: NORMAL
ANION GAP SERPL CALCULATED.3IONS-SCNC: 13 MMOL/L (ref 5–15)
B PARAPERT DNA SPEC QL NAA+PROBE: NOT DETECTED
B PERT DNA SPEC QL NAA+PROBE: NOT DETECTED
BASOPHILS # BLD AUTO: 0 10*3/MM3 (ref 0–0.2)
BASOPHILS NFR BLD AUTO: 0.6 % (ref 0–1.5)
BLD GP AB SCN SERPL QL: NEGATIVE
BUN SERPL-MCNC: 21 MG/DL (ref 8–23)
BUN/CREAT SERPL: 30.9 (ref 7–25)
C PNEUM DNA NPH QL NAA+NON-PROBE: NOT DETECTED
CALCIUM SPEC-SCNC: 8.1 MG/DL (ref 8.6–10.5)
CHLORIDE SERPL-SCNC: 96 MMOL/L (ref 98–107)
CO2 SERPL-SCNC: 23 MMOL/L (ref 22–29)
CREAT SERPL-MCNC: 0.68 MG/DL (ref 0.57–1)
CRP SERPL-MCNC: 3.93 MG/DL (ref 0–0.5)
CRP SERPL-MCNC: 4.07 MG/DL (ref 0–0.5)
D DIMER PPP FEU-MCNC: 0.29 MG/L (FEU) (ref 0–0.59)
D DIMER PPP FEU-MCNC: 0.52 MG/L (FEU) (ref 0–0.59)
DEPRECATED RDW RBC AUTO: 46.8 FL (ref 37–54)
EOSINOPHIL # BLD AUTO: 0 10*3/MM3 (ref 0–0.4)
EOSINOPHIL NFR BLD AUTO: 0.3 % (ref 0.3–6.2)
ERYTHROCYTE [DISTWIDTH] IN BLOOD BY AUTOMATED COUNT: 16.3 % (ref 12.3–15.4)
FERRITIN SERPL-MCNC: 220.5 NG/ML (ref 13–150)
FERRITIN SERPL-MCNC: 226.4 NG/ML (ref 13–150)
FLUAV H1 2009 PAND RNA NPH QL NAA+PROBE: NOT DETECTED
FLUAV H1 HA GENE NPH QL NAA+PROBE: NOT DETECTED
FLUAV H3 RNA NPH QL NAA+PROBE: NOT DETECTED
FLUAV SUBTYP SPEC NAA+PROBE: NOT DETECTED
FLUBV RNA ISLT QL NAA+PROBE: NOT DETECTED
GFR SERPL CREATININE-BSD FRML MDRD: 85 ML/MIN/1.73
GLUCOSE BLDC GLUCOMTR-MCNC: 143 MG/DL (ref 70–105)
GLUCOSE BLDC GLUCOMTR-MCNC: 206 MG/DL (ref 70–105)
GLUCOSE BLDC GLUCOMTR-MCNC: 86 MG/DL (ref 70–105)
GLUCOSE SERPL-MCNC: 130 MG/DL (ref 65–99)
HADV DNA SPEC NAA+PROBE: NOT DETECTED
HBA1C MFR BLD: 6.4 % (ref 3.5–5.6)
HCOV 229E RNA SPEC QL NAA+PROBE: NOT DETECTED
HCOV HKU1 RNA SPEC QL NAA+PROBE: NOT DETECTED
HCOV NL63 RNA SPEC QL NAA+PROBE: NOT DETECTED
HCOV OC43 RNA SPEC QL NAA+PROBE: NOT DETECTED
HCT VFR BLD AUTO: 36.1 % (ref 34–46.6)
HGB BLD-MCNC: 11.7 G/DL (ref 12–15.9)
HMPV RNA NPH QL NAA+NON-PROBE: NOT DETECTED
HPIV1 RNA SPEC QL NAA+PROBE: NOT DETECTED
HPIV2 RNA SPEC QL NAA+PROBE: NOT DETECTED
HPIV3 RNA NPH QL NAA+PROBE: NOT DETECTED
HPIV4 P GENE NPH QL NAA+PROBE: NOT DETECTED
L PNEUMO1 AG UR QL IA: NEGATIVE
LDH SERPL-CCNC: 265 U/L (ref 135–214)
LDH SERPL-CCNC: 301 U/L (ref 135–214)
LYMPHOCYTES # BLD AUTO: 0.8 10*3/MM3 (ref 0.7–3.1)
LYMPHOCYTES NFR BLD AUTO: 36.6 % (ref 19.6–45.3)
M PNEUMO IGG SER IA-ACNC: NOT DETECTED
MAGNESIUM SERPL-MCNC: 1.5 MG/DL (ref 1.6–2.4)
MCH RBC QN AUTO: 26.5 PG (ref 26.6–33)
MCHC RBC AUTO-ENTMCNC: 32.4 G/DL (ref 31.5–35.7)
MCV RBC AUTO: 81.8 FL (ref 79–97)
MONOCYTES # BLD AUTO: 0.1 10*3/MM3 (ref 0.1–0.9)
MONOCYTES NFR BLD AUTO: 6 % (ref 5–12)
NEUTROPHILS NFR BLD AUTO: 1.2 10*3/MM3 (ref 1.7–7)
NEUTROPHILS NFR BLD AUTO: 56.5 % (ref 42.7–76)
NRBC BLD AUTO-RTO: 0.3 /100 WBC (ref 0–0.2)
PLATELET # BLD AUTO: 167 10*3/MM3 (ref 140–450)
PMV BLD AUTO: 9 FL (ref 6–12)
POTASSIUM SERPL-SCNC: 3.6 MMOL/L (ref 3.5–5.2)
PROCALCITONIN SERPL-MCNC: 0.07 NG/ML (ref 0–0.25)
RBC # BLD AUTO: 4.42 10*6/MM3 (ref 3.77–5.28)
RH BLD: POSITIVE
RHINOVIRUS RNA SPEC NAA+PROBE: NOT DETECTED
RSV RNA NPH QL NAA+NON-PROBE: NOT DETECTED
S PNEUM AG SPEC QL LA: NEGATIVE
SARS-COV-2 RNA NPH QL NAA+NON-PROBE: DETECTED
SODIUM SERPL-SCNC: 132 MMOL/L (ref 136–145)
T&S EXPIRATION DATE: NORMAL
WBC # BLD AUTO: 2.1 10*3/MM3 (ref 3.4–10.8)

## 2020-11-09 PROCEDURE — 87899 AGENT NOS ASSAY W/OPTIC: CPT | Performed by: PHYSICIAN ASSISTANT

## 2020-11-09 PROCEDURE — 86900 BLOOD TYPING SEROLOGIC ABO: CPT

## 2020-11-09 PROCEDURE — 83735 ASSAY OF MAGNESIUM: CPT | Performed by: PHYSICIAN ASSISTANT

## 2020-11-09 PROCEDURE — 84145 PROCALCITONIN (PCT): CPT | Performed by: PHYSICIAN ASSISTANT

## 2020-11-09 PROCEDURE — 82728 ASSAY OF FERRITIN: CPT | Performed by: PHYSICIAN ASSISTANT

## 2020-11-09 PROCEDURE — 63710000001 INSULIN GLARGINE PER 5 UNITS: Performed by: INTERNAL MEDICINE

## 2020-11-09 PROCEDURE — 86901 BLOOD TYPING SEROLOGIC RH(D): CPT | Performed by: PHYSICIAN ASSISTANT

## 2020-11-09 PROCEDURE — 0202U NFCT DS 22 TRGT SARS-COV-2: CPT | Performed by: PHYSICIAN ASSISTANT

## 2020-11-09 PROCEDURE — 25010000002 ENOXAPARIN PER 10 MG: Performed by: PHYSICIAN ASSISTANT

## 2020-11-09 PROCEDURE — 25010000002 DEXAMETHASONE SODIUM PHOSPHATE 10 MG/ML SOLUTION: Performed by: PHYSICIAN ASSISTANT

## 2020-11-09 PROCEDURE — 83615 LACTATE (LD) (LDH) ENZYME: CPT | Performed by: PHYSICIAN ASSISTANT

## 2020-11-09 PROCEDURE — 86850 RBC ANTIBODY SCREEN: CPT | Performed by: PHYSICIAN ASSISTANT

## 2020-11-09 PROCEDURE — 82962 GLUCOSE BLOOD TEST: CPT

## 2020-11-09 PROCEDURE — 94799 UNLISTED PULMONARY SVC/PX: CPT

## 2020-11-09 PROCEDURE — 85379 FIBRIN DEGRADATION QUANT: CPT | Performed by: PHYSICIAN ASSISTANT

## 2020-11-09 PROCEDURE — 99233 SBSQ HOSP IP/OBS HIGH 50: CPT | Performed by: INTERNAL MEDICINE

## 2020-11-09 PROCEDURE — 25010000002 MAGNESIUM SULFATE IN D5W 1G/100ML (PREMIX) 1-5 GM/100ML-% SOLUTION: Performed by: PHYSICIAN ASSISTANT

## 2020-11-09 PROCEDURE — 80048 BASIC METABOLIC PNL TOTAL CA: CPT | Performed by: PHYSICIAN ASSISTANT

## 2020-11-09 PROCEDURE — 94640 AIRWAY INHALATION TREATMENT: CPT

## 2020-11-09 PROCEDURE — 86900 BLOOD TYPING SEROLOGIC ABO: CPT | Performed by: PHYSICIAN ASSISTANT

## 2020-11-09 PROCEDURE — 86140 C-REACTIVE PROTEIN: CPT | Performed by: PHYSICIAN ASSISTANT

## 2020-11-09 PROCEDURE — 83036 HEMOGLOBIN GLYCOSYLATED A1C: CPT | Performed by: PHYSICIAN ASSISTANT

## 2020-11-09 PROCEDURE — 85025 COMPLETE CBC W/AUTO DIFF WBC: CPT | Performed by: PHYSICIAN ASSISTANT

## 2020-11-09 PROCEDURE — 86901 BLOOD TYPING SEROLOGIC RH(D): CPT

## 2020-11-09 RX ORDER — MAGNESIUM SULFATE 1 G/100ML
1 INJECTION INTRAVENOUS ONCE
Status: COMPLETED | OUTPATIENT
Start: 2020-11-09 | End: 2020-11-09

## 2020-11-09 RX ORDER — INSULIN GLARGINE 100 [IU]/ML
4 INJECTION, SOLUTION SUBCUTANEOUS NIGHTLY
Status: DISCONTINUED | OUTPATIENT
Start: 2020-11-09 | End: 2020-11-11 | Stop reason: HOSPADM

## 2020-11-09 RX ORDER — ALBUTEROL SULFATE 90 UG/1
2 AEROSOL, METERED RESPIRATORY (INHALATION)
Status: DISCONTINUED | OUTPATIENT
Start: 2020-11-09 | End: 2020-11-11 | Stop reason: HOSPADM

## 2020-11-09 RX ADMIN — AZITHROMYCIN MONOHYDRATE 500 MG: 250 TABLET ORAL at 21:36

## 2020-11-09 RX ADMIN — ALBUTEROL SULFATE 2 PUFF: 90 AEROSOL, METERED RESPIRATORY (INHALATION) at 10:40

## 2020-11-09 RX ADMIN — GUAIFENESIN 1200 MG: 600 TABLET, EXTENDED RELEASE ORAL at 21:36

## 2020-11-09 RX ADMIN — BUDESONIDE AND FORMOTEROL FUMARATE DIHYDRATE 2 PUFF: 160; 4.5 AEROSOL RESPIRATORY (INHALATION) at 07:16

## 2020-11-09 RX ADMIN — SODIUM CHLORIDE 50 ML/HR: 0.9 INJECTION, SOLUTION INTRAVENOUS at 00:11

## 2020-11-09 RX ADMIN — ALBUTEROL SULFATE 2 PUFF: 90 AEROSOL, METERED RESPIRATORY (INHALATION) at 20:12

## 2020-11-09 RX ADMIN — GUAIFENESIN 1200 MG: 600 TABLET, EXTENDED RELEASE ORAL at 01:46

## 2020-11-09 RX ADMIN — ALBUTEROL SULFATE 2 PUFF: 90 AEROSOL, METERED RESPIRATORY (INHALATION) at 15:30

## 2020-11-09 RX ADMIN — BUDESONIDE AND FORMOTEROL FUMARATE DIHYDRATE 2 PUFF: 160; 4.5 AEROSOL RESPIRATORY (INHALATION) at 20:13

## 2020-11-09 RX ADMIN — DEXAMETHASONE SODIUM PHOSPHATE 6 MG: 10 INJECTION, SOLUTION INTRAMUSCULAR; INTRAVENOUS at 11:14

## 2020-11-09 RX ADMIN — Medication 10 ML: at 21:36

## 2020-11-09 RX ADMIN — INSULIN GLARGINE 4 UNITS: 100 INJECTION, SOLUTION SUBCUTANEOUS at 21:37

## 2020-11-09 RX ADMIN — MAGNESIUM SULFATE HEPTAHYDRATE 1 G: 1 INJECTION, SOLUTION INTRAVENOUS at 16:40

## 2020-11-09 RX ADMIN — ALBUTEROL SULFATE 2 PUFF: 90 AEROSOL, METERED RESPIRATORY (INHALATION) at 02:26

## 2020-11-09 RX ADMIN — PANTOPRAZOLE SODIUM 40 MG: 40 TABLET, DELAYED RELEASE ORAL at 11:14

## 2020-11-09 RX ADMIN — ALBUTEROL SULFATE 2 PUFF: 90 AEROSOL, METERED RESPIRATORY (INHALATION) at 07:14

## 2020-11-09 RX ADMIN — ENOXAPARIN SODIUM 40 MG: 40 INJECTION SUBCUTANEOUS at 01:46

## 2020-11-09 RX ADMIN — Medication 10 ML: at 01:46

## 2020-11-09 RX ADMIN — ASPIRIN 81 MG CHEWABLE TABLET 81 MG: 81 TABLET CHEWABLE at 11:14

## 2020-11-09 RX ADMIN — ATORVASTATIN CALCIUM 40 MG: 40 TABLET, FILM COATED ORAL at 11:14

## 2020-11-09 RX ADMIN — ENOXAPARIN SODIUM 40 MG: 40 INJECTION SUBCUTANEOUS at 16:39

## 2020-11-09 NOTE — H&P
[facility] Medicine Services      Patient Name: Zahra Albrecht  : 1950  MRN: [id number]  Primary Care Physician: Luisa Saavedra APRN  Date of admission: 2020    Patient Care Team:  Luisa Saavedra APRN as PCP - General  Luisa Saavedra APRN as PCP - Family Medicine          Subjective   History Present Illness     Chief Complaint:   Chief Complaint   Patient presents with   • Vomiting         Ms. Albrecht is a 70 y.o. female presents to [facility] ER on 2020 complaining of cough, congestion and shortness of breath for the past week.  Patient states that about 6 days ago she developed a cough that is productive with yellow sputum.  Patient also reports some congestion as well.  Patient states that she has had some nausea and vomiting the past 2 days as well.  Patient denies any blood in the emesis.  Patient states that her shortness of breath is worse with exertion and better with rest.  Patient also reports some diarrhea for the past few days as well.  Patient denies any abdominal pain, chest pain, urinary symptoms or swelling in her legs bilaterally.  Patient denies any recent or regular alcohol use.  Patient does report some chills and generalized weakness as well.  Patient reports that she tested positive for COVID-19 about 4 days ago.  Patient was put on azithromycin at that time.    In the ED, initial troponin negative, sodium low 134, chloride low 95, serum creatinine 1.13, BUN of 25, GFR 47.  Last serum creatinine 0.8 about a year ago.  AST and ALT slightly elevated at 35 and 34 respectively.  Lipase elevated at 100.  CBC otherwise unremarkable.  UA shows trace ketones, 2+ protein, 1+ bilirubin, 0-2 RBCs, 6-12 WBCs.  Blood cultures x2 pending.  Chest x-ray shows no acute cardiopulmonary process identified.  CT abdomen and pelvis shows no acute process identified within the abdomen/pelvis.  Within the visualized lung bases are a  couple scattered groundglass densities, suggesting a mild infectious or inflammatory process.  EKG shows sinus rhythm 84 bpm, no ST elevation apparent.  Patient afebrile, pulse in the 80s, on room air oxygen 93% SPO2 and blood pressure 90s over 40s.  Patient then put on 2 L nasal cannula now 96% SPO2.  Patient given azithromycin, dexamethasone, Zofran, 1 L normal saline bolus x2 in ED.    Review of Systems   Constitution: Positive for chills and malaise/fatigue.   HENT: Negative.    Cardiovascular: Positive for dyspnea on exertion. Negative for chest pain, leg swelling, near-syncope, orthopnea and palpitations.   Respiratory: Positive for cough, shortness of breath and sputum production.    Skin: Negative.    Musculoskeletal: Negative.    Gastrointestinal: Positive for diarrhea, nausea and vomiting. Negative for abdominal pain, hematemesis, hematochezia and melena.   Genitourinary: Negative.  Negative for dysuria and frequency.   Neurological: Negative.    Psychiatric/Behavioral: Negative.            Personal History     Past Medical History:   Past Medical History:   Diagnosis Date   • COPD (chronic obstructive pulmonary disease) (CMS/[facility])    • Diabetes mellitus (CMS/[facility])    • Hyperlipidemia    • Hypertension        Surgical History:      Past Surgical History:   Procedure Laterality Date   • CHOLECYSTECTOMY             Family History: family history includes Stroke in her father. Otherwise pertinent FHx was reviewed and unremarkable.     Social History:  reports that she quit smoking about 25 years ago. Her smoking use included cigarettes. She has a 7.50 pack-year smoking history. She has never used smokeless tobacco. She reports that she does not drink alcohol or use drugs.      Medications:  Prior to Admission medications    Medication Sig Start Date End Date Taking? Authorizing Provider   aspirin 81 MG chewable tablet Chew 81 mg Daily.   Yes Provider, MD J Luis   atorvastatin (LIPITOR) 40 MG tablet Take 40  mg by mouth Daily.   Yes J Luis Dash MD   azithromycin (ZITHROMAX) 250 MG tablet Take 250 mg by mouth Daily. For 5 days 11/4/20 11/9/20 Yes J Luis Dash MD   budesonide-formoterol (SYMBICORT) 160-4.5 MCG/ACT inhaler Inhale 2 puffs 2 (Two) Times a Day.   Yes J Luis Dash MD   candesartan (ATACAND) 32 MG tablet Take 32 mg by mouth Daily.   Yes J Luis Dash MD   cloNIDine (CATAPRES) 0.1 MG tablet Take 0.1 mg by mouth Daily.   Yes J Luis Dash MD   hydroCHLOROthiazide (HYDRODIURIL) 25 MG tablet Take 25 mg by mouth Daily.   Yes J Luis Dash MD   metFORMIN ER (GLUCOPHAGE-XR) 500 MG 24 hr tablet Take 1,000 mg by mouth Daily With Breakfast.   Yes J Luis Dash MD   omeprazole (priLOSEC) 40 MG capsule Take 40 mg by mouth Daily.   Yes J Luis Dash MD   ondansetron ODT (ZOFRAN-ODT) 4 MG disintegrating tablet Place 4 mg on the tongue Every 8 (Eight) Hours As Needed for Nausea or Vomiting.   Yes J Luis Dash MD       Allergies:    Allergies   Allergen Reactions   • Sulfa Antibiotics Anaphylaxis       Objective   Objective     Vital Signs  Temp:  [99.1 °F (37.3 °C)] 99.1 °F (37.3 °C)  Heart Rate:  [] 66  Resp:  [15-19] 15  BP: ()/(37-84) 121/57  SpO2:  [90 %-99 %] 96 %  on  Flow (L/min):  [2] 2;   Device (Oxygen Therapy): nasal cannula  Body mass index is 38.49 kg/m².    Physical Exam  Vitals signs and nursing note reviewed.   Constitutional:       General: She is not in acute distress.     Appearance: She is well-developed. She is obese. She is not diaphoretic.   HENT:      Head: Normocephalic and atraumatic.      Nose: Nose normal.      Mouth/Throat:      Pharynx: No oropharyngeal exudate.   Eyes:      Extraocular Movements: Extraocular movements intact.      Conjunctiva/sclera: Conjunctivae normal.      Pupils: Pupils are equal, round, and reactive to light.   Neck:      Musculoskeletal: Normal range of motion.   Cardiovascular:       Rate and Rhythm: Normal rate and regular rhythm.      Heart sounds: Normal heart sounds.      Comments: S1, S2 audible.  Pulmonary:      Effort: Pulmonary effort is normal. No respiratory distress.      Breath sounds: Normal breath sounds. No wheezing, rhonchi or rales.      Comments: On diminished breath sounds bilaterally  Abdominal:      General: Bowel sounds are normal. There is no distension.      Palpations: Abdomen is soft.      Tenderness: There is no abdominal tenderness. There is no guarding or rebound.   Musculoskeletal: Normal range of motion.         General: No tenderness or deformity.   Skin:     General: Skin is warm.      Findings: No erythema or rash.   Neurological:      Mental Status: She is alert and oriented to person, place, and time.      Cranial Nerves: No cranial nerve deficit.   Psychiatric:         Mood and Affect: Mood normal.         Behavior: Behavior normal.         Results Review:  I have personally reviewed most recent cardiac tracings, lab results and radiology images and interpretations and agree with findings.    Results from last 7 days   Lab Units 11/08/20  1346   WBC 10*3/mm3 4.60   HEMOGLOBIN g/dL 12.9   HEMATOCRIT % 39.3   PLATELETS 10*3/mm3 183     Results from last 7 days   Lab Units 11/09/20  0001 11/08/20  1346 11/08/20  1345   SODIUM mmol/L  --  134*  --    POTASSIUM mmol/L  --  3.7  --    CHLORIDE mmol/L  --  95*  --    CO2 mmol/L  --  24.0  --    BUN mg/dL  --  25*  --    CREATININE mg/dL  --  1.13*  --    GLUCOSE mg/dL  --  89  --    CALCIUM mg/dL  --  8.7  --    ALT (SGPT) U/L  --  34*  --    AST (SGOT) U/L  --  35*  --    TROPONIN T ng/mL  --  <0.010  --    LACTATE mmol/L  --   --  1.0   PROCALCITONIN ng/mL 0.07  --   --      Estimated Creatinine Clearance: 47.3 mL/min (A) (by C-G formula based on SCr of 1.13 mg/dL (H)).  Brief Urine Lab Results  (Last result in the past 365 days)      Color   Clarity   Blood   Leuk Est   Nitrite   Protein   CREAT   Urine HCG           11/08/20 1451 Dark Yellow  Comment:  Result checked  Cloudy  Comment:  Result checked  Negative Negative Negative 100 mg/dL (2+)               Microbiology Results (last 10 days)     Procedure Component Value - Date/Time    Respiratory Panel PCR w/COVID-19(SARS-CoV-2) KATELYN/LIDYA/MARTINEZ/PAD/COR/MAD/JONO In-House, NP Swab in UTM/VTM, 3-4 HR TAT - Swab, Nasopharynx [[id number]]  (Abnormal) Collected: 11/09/20 0001    Lab Status: Final result Specimen: Swab from Nasopharynx Updated: 11/09/20 0131     ADENOVIRUS, PCR Not Detected     Coronavirus 229E Not Detected     Coronavirus HKU1 Not Detected     Coronavirus NL63 Not Detected     Coronavirus OC43 Not Detected     COVID19 Detected     Human Metapneumovirus Not Detected     Human Rhinovirus/Enterovirus Not Detected     Influenza A PCR Not Detected     Influenza A H1 Not Detected     Influenza A H1 2009 PCR Not Detected     Influenza A H3 Not Detected     Influenza B PCR Not Detected     Parainfluenza Virus 1 Not Detected     Parainfluenza Virus 2 Not Detected     Parainfluenza Virus 3 Not Detected     Parainfluenza Virus 4 Not Detected     RSV, PCR Not Detected     Bordetella pertussis pcr Not Detected     Bordetella parapertussis PCR Not Detected     Chlamydophila pneumoniae PCR Not Detected     Mycoplasma pneumo by PCR Not Detected    Narrative:      Fact sheet for providers: https://docs.[employer]/wp-content/uploads/DFZ2898-3195-KN6.1-EUA-Provider-Fact-Sheet-3.pdf    Fact sheet for patients: https://docs.[employer]/wp-content/uploads/ZUJ1816-7777-AS9.1-EUA-Patient-Fact-Sheet-1.pdf          ECG/EMG Results (most recent)     Procedure Component Value Units Date/Time    ECG 12 Lead [[id number]] Collected: 11/08/20 1354     Updated: 11/08/20 1412     QT Interval 358 ms     Narrative:      HEART RATE= 84  bpm  RR Interval= 720  ms  HI Interval= 138  ms  P Horizontal Axis= 7  deg  P Front Axis= 73  deg  QRSD Interval= 73  ms  QT Interval= 358  ms  QRS Axis= 12  deg  T  Wave Axis= 47  deg  - NORMAL ECG -  Sinus rhythm  When compared with ECG of 11-May-2019 18:56:26,  Significant rate decrease  Electronically Signed By:   Date and Time of Study: 2020-11-08 13:54:05                    Ct Abdomen Pelvis Without Contrast    Result Date: 11/8/2020  1.No acute process identified within abdomen/pelivs. 2.Within the visualized lung bases are a couple scattered groundglass densities, suggesting a mild infectious or inflammatory process.    Electronically Signed By-DR. Rodney Escudero MD On:11/8/2020 4:01 PM This report was finalized on [id number] by DR. Rodney Escudero MD.    Xr Chest 1 View    Result Date: 11/8/2020  No acute cardiopulmonary process identified.  Electronically Signed By-DR. Rodney Escudero MD On:11/8/2020 1:43 PM This report was finalized on [id number] by DR. Rodney Escudero MD.        Estimated Creatinine Clearance: 47.3 mL/min (A) (by C-G formula based on SCr of 1.13 mg/dL (H)).    Assessment/Plan   Assessment/Plan       Active Hospital Problems    Diagnosis  POA   • **COVID-19 virus infection [U07.1]  Yes   • Nausea and vomiting [R11.2]  Yes   • Elevated lipase [R74.8]  Yes   • Hypotension [I95.9]  Yes   • CKD (chronic kidney disease), stage III [N18.30]  Yes   • CAD (coronary artery disease) [I25.10]  Yes   • Gastroesophageal reflux disease [K21.9]  Yes   • Obesity (BMI 30-39.9) [E66.9]  Yes   • Acute respiratory distress [R06.03]  Yes   • COPD exacerbation (CMS/HCC) [J44.1]  Yes   • Hyperlipidemia [E78.5]  Yes   • Diabetes mellitus (CMS/HCC) [E11.9]  Yes   • Hypertension [I10]  Yes      Resolved Hospital Problems   No resolved problems to display.         COVID-19 infection, COPD exacerbation  - initial troponin negative  -AST and ALT slightly elevated at 35 and 34 respectively.    -CBC otherwise unremarkable.   -UA shows trace ketones, 2+ protein, 1+ bilirubin, 0-2 RBCs, 6-12 WBCs.    -Blood cultures x2 pending.    -Chest x-ray shows no acute  cardiopulmonary process identified.   - CT abdomen and pelvis shows no acute process identified within the abdomen/pelvis.  Within the visualized lung bases are a couple scattered groundglass densities, suggesting a mild infectious or inflammatory process.    -EKG shows sinus rhythm 84 bpm, no ST elevation apparent.    -Patient afebrile, pulse in the 80s, on room air oxygen 93% SPO2 and blood pressure 90s over 40s.   - Patient then put on 2 L nasal cannula now 96% SPO2.   - Patient given azithromycin, dexamethasone, Zofran, 1 L normal saline bolus x2 in ED.  -Continue dexamethasone  -Continue azithromycin  -Check pro-Seferino, daily CRP, ferritin, LDH, D-dimer  -Pharmacy to dose Lovenox per COVID-19 protocol  -Check sputum culture, urine antigens  -Mucinex and Tessalon Perles ordered  -Continue home Symbicort  -Patient not on home O2  -Droplet precaution/isolation  -Diabetic diet ordered    Nausea, vomiting and diarrhea  -Likely secondary to above  -CT abdomen pelvis shows no acute process identified within the abdomen/pelvis.  -IV fluids 50 mL/h normal saline for now    Elevated lipase  - Lipase elevated at 100.   -Patient nontender to palpation, no CT evidence of pancreatitis  -Monitor    Hypotension  -Blood pressure 90s over 40s in the ED  -Continue IV fluids 50 mL/h, saline for now    CKD stage III  -serum creatinine 1.13, BUN of 25, GFR 47.   - Last serum creatinine 0.8 about a year ago.  -50 mL/h normal saline  -Monitor BMP    CAD  -Continue home aspirin    Hyperlipidemia  -Continue home statin    Essential hypertension, chronic, controlled  -Hold home valsartan, clonidine, hydrochlorothiazide due to borderline hypotension in ED    Diabetes mellitus type 2  -Hold home Metformin  -SSI, Accu-Cheks 3 times daily before meals  -Check A1c    GERD  -Continue home PPI    Obesity, BMI 38.49  -Encourage lifestyle modifications        VTE Prophylaxis -pharmacy to dose Lovenox per COVID-19 protocol  Mechanical Order History:      None      Pharmalogical Order History:     None          CODE STATUS:    Code Status and Medical Interventions:   Ordered at: 11/08/20 2126     Level Of Support Discussed With:    Patient     Code Status:    CPR     Medical Interventions (Level of Support Prior to Arrest):    Full       This patient has been examined wearing appropriate Personal Protective Equipment and discussed with hospital infection control department. 11/09/20      I discussed the patient's findings and my recommendations with patient.      Signature:Electronically signed by TAMIKO Perales, 11/09/20, 6:10 AM EST.      [facility] Hospitalist Team

## 2020-11-09 NOTE — PROGRESS NOTES
[facility] Medicine Services Daily Progress Note      Hospitalist Team  LOS 1 days      Patient Care Team:  Luisa Saavedra APRN as PCP - General  Luisa Saavedra APRN as PCP - Family Medicine    Patient Location: 13/13      Subjective   Subjective     Chief Complaint / Subjective  Chief Complaint   Patient presents with   • Vomiting         Brief Synopsis of Hospital Course/HPI  70-year-old female with multiple comorbidities including diabetes mellitus type 2, GERD,  and hyperlipidemia.  Presented to the emergency room on 11/8/2020 with complaints of progressively worsening cough, chest congestion and shortness of breath x5 to 6 days.  Patient reported that she tested positive for COVID-19 about 4 days ago and was started on azithromycin.  Complained of subjective fever, chills and generalized body weakness.  After evaluating in the ED, patient was admitted for further care.      Date::    11/9/2020  Patient seen and examined  Shortness of breath continues  She is saturating greater than 94% on 2 L of oxygen        Review of Systems   Constitution: Positive for chills and malaise/fatigue. Negative for fever.   HENT: Positive for congestion.    Eyes: Negative for blurred vision.   Cardiovascular: Negative for chest pain.   Respiratory: Positive for shortness of breath.    Endocrine: Negative for cold intolerance and heat intolerance.   Gastrointestinal: Negative for abdominal pain, nausea and vomiting.   Genitourinary: Negative for dysuria.   Neurological: Positive for weakness.   Psychiatric/Behavioral: Negative for altered mental status.         Objective   Objective      Vital Signs  Temp:  [99.1 °F (37.3 °C)] 99.1 °F (37.3 °C)  Heart Rate:  [] 69  Resp:  [15-27] 27  BP: ()/(37-84) 134/71  Oxygen Therapy  SpO2: 95 %  Pulse Oximetry Type: Continuous  Device (Oxygen Therapy): nasal cannula  Flow (L/min): 2  Oxygen Concentration (%): 28  Flowsheet Rows      First  "Filed Value   Admission Height  154.9 cm (61\") Documented at 11/08/2020 1303   Admission Weight  92.4 kg (203 lb 11.3 oz) Documented at 11/08/2020 1303        Intake & Output (last 3 days)       11/06 0701 - 11/07 0700 11/07 0701 - 11/08 0700 11/08 0701 - 11/09 0700 11/09 0701 - 11/10 0700    IV Piggyback   2250     Total Intake(mL/kg)   2250 (24.4)     Net   +2250                 Lines, Drains & Airways    Active LDAs     Name:   Placement date:   Placement time:   Site:   Days:    Peripheral IV 11/08/20 1345 Left Forearm   11/08/20    1345    Forearm   less than 1                  Physical Exam:    Physical Exam  Vitals signs reviewed.   Constitutional:       General: She is not in acute distress.     Appearance: She is obese.   HENT:      Head: Normocephalic and atraumatic.      Nose: Nose normal.      Mouth/Throat:      Mouth: Mucous membranes are moist.   Eyes:      Extraocular Movements: Extraocular movements intact.      Conjunctiva/sclera: Conjunctivae normal.      Pupils: Pupils are equal, round, and reactive to light.   Neck:      Musculoskeletal: Neck supple.   Cardiovascular:      Rate and Rhythm: Normal rate and regular rhythm.   Pulmonary:      Effort: No respiratory distress.      Breath sounds: Normal breath sounds.   Abdominal:      General: Bowel sounds are normal.      Palpations: Abdomen is soft.      Tenderness: There is no abdominal tenderness.   Musculoskeletal: Normal range of motion.   Skin:     General: Skin is warm and dry.   Neurological:      Mental Status: She is alert and oriented to person, place, and time.   Psychiatric:         Mood and Affect: Mood normal.               Procedures:              Results Review:     I reviewed the patient's new clinical results.      Lab Results (last 24 hours)     Procedure Component Value Units Date/Time    Respiratory Panel PCR w/COVID-19(SARS-CoV-2) KATELYN/LIDYA/MARTINEZ/PAD/COR/MAD/JONO In-House, NP Swab in UTM/VTM, 3-4 HR TAT - Swab, Nasopharynx " [[id number]]  (Abnormal) Collected: 11/09/20 0001    Specimen: Swab from Nasopharynx Updated: 11/09/20 0131     ADENOVIRUS, PCR Not Detected     Coronavirus 229E Not Detected     Coronavirus HKU1 Not Detected     Coronavirus NL63 Not Detected     Coronavirus OC43 Not Detected     COVID19 Detected     Human Metapneumovirus Not Detected     Human Rhinovirus/Enterovirus Not Detected     Influenza A PCR Not Detected     Influenza A H1 Not Detected     Influenza A H1 2009 PCR Not Detected     Influenza A H3 Not Detected     Influenza B PCR Not Detected     Parainfluenza Virus 1 Not Detected     Parainfluenza Virus 2 Not Detected     Parainfluenza Virus 3 Not Detected     Parainfluenza Virus 4 Not Detected     RSV, PCR Not Detected     Bordetella pertussis pcr Not Detected     Bordetella parapertussis PCR Not Detected     Chlamydophila pneumoniae PCR Not Detected     Mycoplasma pneumo by PCR Not Detected    Narrative:      Fact sheet for providers: https://docs.[employer]/wp-content/uploads/EDS7792-6049-YS1.1-EUA-Provider-Fact-Sheet-3.pdf    Fact sheet for patients: https://docs.[employer]/wp-content/uploads/UZU0810-1956-DM6.1-EUA-Patient-Fact-Sheet-1.pdf    Lactate Dehydrogenase [[id number]] Updated: 11/09/20 0049    Specimen: Blood     Procalcitonin [[id number]]  (Normal) Collected: 11/09/20 0001    Specimen: Blood Updated: 11/09/20 0034     Procalcitonin 0.07 ng/mL     Narrative:      As a Marker for Sepsis (Non-Neonates):   1. <0.5 ng/mL represents a low risk of severe sepsis and/or septic shock.  1. >2 ng/mL represents a high risk of severe sepsis and/or septic shock.    As a Marker for Lower Respiratory Tract Infections that require antibiotic therapy:  PCT on Admission     Antibiotic Therapy             6-12 Hrs later  > 0.5                Strongly Recommended            >0.25 - <0.5         Recommended  0.1 - 0.25           Discouraged                   Remeasure/reassess PCT  <0.1                 Strongly  "Discouraged          Remeasure/reassess PCT      As 28 day mortality risk marker: \"Change in Procalcitonin Result\" (> 80 % or <=80 %) if Day 0 (or Day 1) and Day 4 values are available. Refer to http://www.[facility] CenterTrufflspct-calculator.com/   Change in PCT <=80 %   A decrease of PCT levels below or equal to 80 % defines a positive change in PCT test result representing a higher risk for 28-day all-cause mortality of patients diagnosed with severe sepsis or septic shock.  Change in PCT > 80 %   A decrease of PCT levels of more than 80 % defines a negative change in PCT result representing a lower risk for 28-day all-cause mortality of patients diagnosed with severe sepsis or septic shock.                Results may be falsely decreased if patient taking Biotin.     Ferritin [[id number]]  (Abnormal) Collected: 11/09/20 0001    Specimen: Blood Updated: 11/09/20 0033     Ferritin 220.50 ng/mL     Narrative:      Results may be falsely decreased if patient taking Biotin.      C-reactive Protein [[id number]]  (Abnormal) Collected: 11/09/20 0001    Specimen: Blood Updated: 11/09/20 0028     C-Reactive Protein 4.07 mg/dL     D-dimer, Quantitative [[id number]]  (Normal) Collected: 11/09/20 0001    Specimen: Blood Updated: 11/09/20 0017     D-Dimer, Quantitative 0.52 mg/L (FEU)     Narrative:      Reference Range  --------------------------------------------------------------------     < 0.50   Negative Predictive Value  0.50-0.59   Indeterminate    >= 0.60   Probable VTE             A very low percentage of patients with DVT may yield D-Dimer results   below the cut-off of 0.50 mg/L FEU.  This is known to be more   prevalent in patients with distal DVT.             Results of this test should always be interpreted in conjunction with   the patient's medical history, clinical presentation and other   findings.  Clinical diagnosis should not be based on the result of   INNOVANCE D-Dimer alone.    Hemoglobin A1c [[id number]] Collected: " 11/09/20 0001    Specimen: Blood Updated: 11/09/20 0005    Urinalysis, Microscopic Only - Urine, Catheter [[id number]]  (Abnormal) Collected: 11/08/20 1451    Specimen: Urine, Catheter Updated: 11/08/20 1538     RBC, UA 0-2 /HPF      WBC, UA 6-12 /HPF      Bacteria, UA None Seen /HPF      Squamous Epithelial Cells, UA 0-2 /HPF      Renal Epithelial Cells, UA 3-6 /HPF      Hyaline Casts, UA 13-20 /LPF      Amorphous Crystals, UA Small/1+ /HPF      Methodology Manual Light Microscopy    Urinalysis With Microscopic If Indicated (No Culture) - Urine, Catheter [[id number]]  (Abnormal) Collected: 11/08/20 1451    Specimen: Urine, Catheter Updated: 11/08/20 1517     Color, UA Dark Yellow     Comment: Result checked         Appearance, UA Cloudy     Comment: Result checked         pH, UA 5.5     Specific Gravity, UA 1.023     Glucose, UA Negative     Ketones, UA Trace     Bilirubin, UA Small (1+)     Comment: Confirmation testing is unavailable.  A serum bilirubin is recommended for further assessment.        Blood, UA Negative     Protein,  mg/dL (2+)     Leuk Esterase, UA Negative     Nitrite, UA Negative     Urobilinogen, UA 0.2 E.U./dL    Extra Tubes [[id number]] Collected: 11/08/20 1354    Specimen: Blood from Arm, Right Updated: 11/08/20 1500    Narrative:      The following orders were created for panel order Extra Tubes.  Procedure                               Abnormality         Status                     ---------                               -----------         ------                     Light Blue Top[[id number]]                                   Final result               [facility][[id number]]                                   Final result                 Please view results for these tests on the individual orders.    Light Blue Top [[id number]] Collected: 11/08/20 1354    Specimen: Blood from Arm, Right Updated: 11/08/20 1500     Extra Tube hold for add-on     Comment: Auto resulted       Comprehensive  Metabolic Panel [[id number]]  (Abnormal) Collected: 11/08/20 1346    Specimen: Blood from Arm, Left Updated: 11/08/20 1420     Glucose 89 mg/dL      BUN 25 mg/dL      Creatinine 1.13 mg/dL      Sodium 134 mmol/L      Potassium 3.7 mmol/L      Chloride 95 mmol/L      CO2 24.0 mmol/L      Calcium 8.7 mg/dL      Total Protein 6.5 g/dL      Albumin 3.60 g/dL      ALT (SGPT) 34 U/L      AST (SGOT) 35 U/L      Alkaline Phosphatase 67 U/L      Total Bilirubin 0.4 mg/dL      eGFR Non African Amer 47 mL/min/1.73      Globulin 2.9 gm/dL      A/G Ratio 1.2 g/dL      BUN/Creatinine Ratio 22.1     Anion Gap 15.0 mmol/L     Narrative:      GFR Normal >60  Chronic Kidney Disease <60  Kidney Failure <15      Lipase [[id number]]  (Abnormal) Collected: 11/08/20 1346    Specimen: Blood from Arm, Left Updated: 11/08/20 1420     Lipase 100 U/L     Troponin [[id number]]  (Normal) Collected: 11/08/20 1346    Specimen: Blood from Arm, Left Updated: 11/08/20 1420     Troponin T <0.010 ng/mL     Narrative:      Troponin T Reference Range:  <= 0.03 ng/mL-   Negative for AMI  >0.03 ng/mL-     Abnormal for myocardial necrosis.  Clinicians would have to utilize clinical acumen, EKG, Troponin and serial changes to determine if it is an Acute Myocardial Infarction or myocardial injury due to an underlying chronic condition.       Results may be falsely decreased if patient taking Biotin.      [facility] [[id number]] Collected: 11/08/20 1354    Specimen: Blood from Arm, Right Updated: 11/08/20 1406    CBC & Differential [[id number]]  (Abnormal) Collected: 11/08/20 1346    Specimen: Blood from Arm, Left Updated: 11/08/20 1358    Narrative:      The following orders were created for panel order CBC & Differential.  Procedure                               Abnormality         Status                     ---------                               -----------         ------                     CBC Auto Differential[[id number]]        Abnormal            Final  result                 Please view results for these tests on the individual orders.    CBC Auto Differential [[id number]]  (Abnormal) Collected: 11/08/20 1346    Specimen: Blood from Arm, Left Updated: 11/08/20 1358     WBC 4.60 10*3/mm3      RBC 4.78 10*6/mm3      Hemoglobin 12.9 g/dL      Hematocrit 39.3 %      MCV 82.2 fL      MCH 27.1 pg      MCHC 32.9 g/dL      RDW 15.8 %      RDW-SD 44.6 fl      MPV 9.3 fL      Platelets 183 10*3/mm3      Neutrophil % 65.5 %      Lymphocyte % 24.4 %      Monocyte % 9.1 %      Eosinophil % 0.2 %      Basophil % 0.8 %      Neutrophils, Absolute 3.00 10*3/mm3      Lymphocytes, Absolute 1.10 10*3/mm3      Monocytes, Absolute 0.40 10*3/mm3      Eosinophils, Absolute 0.00 10*3/mm3      Basophils, Absolute 0.00 10*3/mm3      nRBC 0.0 /100 WBC     POC Lactate [[id number]]  (Normal) Collected: 11/08/20 1345    Specimen: Blood Updated: 11/08/20 1357     Lactate 1.0 mmol/L      Comment: Serial Number: [id number]:  [id number]       Blood Culture - Blood, Arm, Left [[id number]] Collected: 11/08/20 1347    Specimen: Blood from Arm, Left Updated: 11/08/20 1352    Blood Culture - Blood, Arm, Right [[id number]] Collected: 11/08/20 1347    Specimen: Blood from Arm, Right Updated: 11/08/20 1352        No results found for: HGBA1C            Lab Results   Component Value Date    LIPASE 100 (H) 11/08/2020     Lab Results   Component Value Date    CHOL 140 05/10/2018    TRIG 115 05/10/2018    HDL 40 05/10/2018    LDL 86 05/10/2018       No results found for: INTRAOP, PREDX, FINALDX, COMDX    Microbiology Results (last 10 days)     Procedure Component Value - Date/Time    Respiratory Panel PCR w/COVID-19(SARS-CoV-2) KATELYN/LIDYA/MARTINEZ/PAD/COR/MAD/JONO In-House, NP Swab in UTM/VTM, 3-4 HR TAT - Swab, Nasopharynx [[id number]]  (Abnormal) Collected: 11/09/20 0001    Lab Status: Final result Specimen: Swab from Nasopharynx Updated: 11/09/20 0131     ADENOVIRUS, PCR Not Detected     Coronavirus 229E Not  Detected     Coronavirus HKU1 Not Detected     Coronavirus NL63 Not Detected     Coronavirus OC43 Not Detected     COVID19 Detected     Human Metapneumovirus Not Detected     Human Rhinovirus/Enterovirus Not Detected     Influenza A PCR Not Detected     Influenza A H1 Not Detected     Influenza A H1 2009 PCR Not Detected     Influenza A H3 Not Detected     Influenza B PCR Not Detected     Parainfluenza Virus 1 Not Detected     Parainfluenza Virus 2 Not Detected     Parainfluenza Virus 3 Not Detected     Parainfluenza Virus 4 Not Detected     RSV, PCR Not Detected     Bordetella pertussis pcr Not Detected     Bordetella parapertussis PCR Not Detected     Chlamydophila pneumoniae PCR Not Detected     Mycoplasma pneumo by PCR Not Detected    Narrative:      Fact sheet for providers: https://docs.[employer]/wp-content/uploads/WFL6383-0693-ND4.1-EUA-Provider-Fact-Sheet-3.pdf    Fact sheet for patients: https://docs.[employer]/wp-content/uploads/GCN6088-0299-SV5.1-EUA-Patient-Fact-Sheet-1.pdf          ECG/EMG Results (most recent)     Procedure Component Value Units Date/Time    ECG 12 Lead [[id number]] Collected: 11/08/20 1354     Updated: 11/08/20 1412     QT Interval 358 ms     Narrative:      HEART RATE= 84  bpm  RR Interval= 720  ms  VA Interval= 138  ms  P Horizontal Axis= 7  deg  P Front Axis= 73  deg  QRSD Interval= 73  ms  QT Interval= 358  ms  QRS Axis= 12  deg  T Wave Axis= 47  deg  - NORMAL ECG -  Sinus rhythm  When compared with ECG of 11-May-2019 18:56:26,  Significant rate decrease  Electronically Signed By:   Date and Time of Study: 2020-11-08 13:54:05                    Ct Abdomen Pelvis Without Contrast    Result Date: 11/8/2020  1.No acute process identified within abdomen/pelivs. 2.Within the visualized lung bases are a couple scattered groundglass densities, suggesting a mild infectious or inflammatory process.    Electronically Signed By-DR. Rodney Escudero MD On:11/8/2020 4:01 PM This report  was finalized on [id number] by DR. Rodney Escudero MD.    Xr Chest 1 View    Result Date: 11/8/2020  No acute cardiopulmonary process identified.  Electronically Signed By-DR. Rodney Escudero MD On:11/8/2020 1:43 PM This report was finalized on [id number] by DR. Rodney Escudero MD.          Xrays, labs reviewed personally by physician.    Medication Review:   I have reviewed the patient's current medication list      Scheduled Meds  albuterol sulfate HFA, 2 puff, Inhalation, Q4H - RT  aspirin, 81 mg, Oral, Daily  atorvastatin, 40 mg, Oral, Daily  azithromycin, 500 mg, Oral, Q24H  budesonide-formoterol, 2 puff, Inhalation, BID - RT  dexamethasone, 6 mg, Intravenous, Daily  enoxaparin, 40 mg, Subcutaneous, Daily  guaiFENesin, 1,200 mg, Oral, Q12H  insulin lispro, 0-9 Units, Subcutaneous, TID AC  pantoprazole, 40 mg, Oral, QAM AC  sodium chloride, 10 mL, Intravenous, Q12H        Meds Infusions  Pharmacy to Dose enoxaparin (LOVENOX),   sodium chloride, 50 mL/hr, Last Rate: 50 mL/hr (11/09/20 0517)        Meds PRN  •  acetaminophen **OR** acetaminophen **OR** acetaminophen  •  albuterol sulfate HFA  •  aluminum-magnesium hydroxide-simethicone  •  benzonatate  •  bisacodyl  •  dextrose  •  dextrose  •  glucagon (human recombinant)  •  insulin lispro **AND** insulin lispro  •  magnesium hydroxide  •  magnesium sulfate **OR** magnesium sulfate in D5W 1g/100mL (PREMIX)  •  melatonin  •  nitroglycerin  •  ondansetron **OR** ondansetron  •  ondansetron ODT  •  Pharmacy to Dose enoxaparin (LOVENOX)  •  potassium chloride  •  [COMPLETED] Insert peripheral IV **AND** sodium chloride  •  sodium chloride        Assessment/Plan   Assessment/Plan     Active Hospital Problems    Diagnosis  POA   • **COVID-19 virus infection [U07.1]  Yes   • Nausea and vomiting [R11.2]  Yes   • Elevated lipase [R74.8]  Yes   • Hypotension [I95.9]  Yes   • CKD (chronic kidney disease), stage III [N18.30]  Yes   • CAD (coronary artery disease)  [I25.10]  Yes   • Gastroesophageal reflux disease [K21.9]  Yes   • Obesity (BMI 30-39.9) [E66.9]  Yes   • Acute respiratory distress [R06.03]  Yes   • COPD exacerbation (CMS/[facility]) [J44.1]  Yes   • Hyperlipidemia [E78.5]  Yes   • Diabetes mellitus (CMS/[facility]) [E11.9]  Yes   • Hypertension [I10]  Yes      Resolved Hospital Problems   No resolved problems to display.       MEDICAL DECISION MAKING COMPLEXITY BY PROBLEM:     Covid 19 infection  Chest x-ray reviewed-showed no acute cardiopulmonary abnormality  CT abdomen/pelvis showed findings suggestive of infectious/inflammatory process in the lung bases.  Monitor inflammatory markers  On Decadron and azithromycin  Oxygen therapy and titration    Reported diarrhea, nausea and vomiting  Probably due to the above  Resolved  Continue supportive care     Acute kidney failure  Most likely due to dehydration from nausea and vomiting with diarrhea  In the setting of HCTZ, Metformin and valsartan   Use  improved with IV fluid     Hyperlipidemia-on statin     Essential hypertension  Was noted to be hypotensive on presentation  Blood pressure has improved  To avoid rebound hypertension-we will continue only on clonidine  Continue to hold his HCTZ and valsartan      Diabetes mellitus type 2  A1c 6.4  Hold Metformin  On Premeal insulin and sliding scale insulin  Start on low dose Lantus at bedtime    GERD-on PPI     Obesity, BMI 38.49  -Encourage lifestyle modifications           VTE Prophylaxis -on Lovenox    Mechanical Order History:     None      Pharmalogical Order History:      Ordered     Dose Route Frequency Stop    11/09/20 0119  enoxaparin (LOVENOX) syringe 40 mg      40 mg SC Daily --    11/08/20 2340  Pharmacy to Dose enoxaparin (LOVENOX)     Question:  Indication of use  Answer:  Prophylaxis    -- XX Continuous PRN --                  Code Status -   Code Status and Medical Interventions:   Ordered at: 11/08/20 2126     Level Of Support Discussed With:    Patient     Code  Status:    CPR     Medical Interventions (Level of Support Prior to Arrest):    Full       This patient has been examined with appropriate PPE. 11/09/20        Discharge Planning            Electronically signed by Martin Morris MD, 11/09/20, 09:02 EST.  [facility] Hospitalist Team

## 2020-11-09 NOTE — PROGRESS NOTES
Discharge Planning Assessment  [facility]     Patient Name: Zahra Albrecht  MRN: [id number]  Today's Date: 11/9/2020    Admit Date: 11/8/2020    Discharge Needs Assessment     Row Name 11/09/20 0717       Living Environment    Lives With  child(agustina), adult    Current Living Arrangements  home/apartment/condo    Primary Care Provided by  self    Provides Primary Care For  no one    Family Caregiver if Needed  child(agustina), adult    Able to Return to Prior Arrangements  yes       Resource/Environmental Concerns    Resource/Environmental Concerns  none    Transportation Concerns  car, none       Transition Planning    Patient/Family Anticipates Transition to  home with family    Patient/Family Anticipated Services at Transition  none    Transportation Anticipated  family or friend will provide       Discharge Needs Assessment    Readmission Within the Last 30 Days  no previous admission in last 30 days    Equipment Currently Used at Home  none    Concerns to be Addressed  denies needs/concerns at this time    Anticipated Changes Related to Illness  inability to care for self        Discharge Plan     Row Name 11/09/20 0717       Plan    Plan  Anticipate return home    Patient/Family in Agreement with Plan  yes    Plan Comments  spoke to patient via telephone.   Patient states she lives with her daughter.  Reports she still drives and is independent with ADLs.  Denies any issues obtaining medications. Denies any needs right now.  PCP: JULES Saavedra  Pharmacy: [facility] in [place] for short term medications          Demographic Summary     Row Name 11/09/20 0716       General Information    Admission Type  inpatient    Arrived From  emergency department    Required Notices Provided  Important Message from Medicare    Referral Source  case finding    Reason for Consult  discharge planning     Used During This Interaction  no        Functional Status     Row Name 11/09/20 0717       Functional Status, IADL     Medications  independent    Meal Preparation  independent    Housekeeping  independent    Laundry  independent    Shopping  independent        Phone communication only - no physical contact with patient or family.    Luciana AlvarezRN    /Utilization Review  [address] [address]    [phone] office  [phone] fax  [email or web address]  [facility]    Hospital NPI:   Hospital Tax ID: [id number]

## 2020-11-10 LAB
ALBUMIN SERPL-MCNC: 3.2 G/DL (ref 3.5–5.2)
ALBUMIN/GLOB SERPL: 1.3 G/DL
ALP SERPL-CCNC: 56 U/L (ref 39–117)
ALT SERPL W P-5'-P-CCNC: 30 U/L (ref 1–33)
ANION GAP SERPL CALCULATED.3IONS-SCNC: 12 MMOL/L (ref 5–15)
AST SERPL-CCNC: 29 U/L (ref 1–32)
BASOPHILS # BLD AUTO: 0 10*3/MM3 (ref 0–0.2)
BASOPHILS NFR BLD AUTO: 0.3 % (ref 0–1.5)
BILIRUB SERPL-MCNC: 0.3 MG/DL (ref 0–1.2)
BUN SERPL-MCNC: 19 MG/DL (ref 8–23)
BUN/CREAT SERPL: 22.9 (ref 7–25)
CALCIUM SPEC-SCNC: 8.1 MG/DL (ref 8.6–10.5)
CHLORIDE SERPL-SCNC: 103 MMOL/L (ref 98–107)
CO2 SERPL-SCNC: 24 MMOL/L (ref 22–29)
CREAT SERPL-MCNC: 0.83 MG/DL (ref 0.57–1)
CRP SERPL-MCNC: 2.21 MG/DL (ref 0–0.5)
D DIMER PPP FEU-MCNC: 0.23 MG/L (FEU) (ref 0–0.59)
DEPRECATED RDW RBC AUTO: 46.4 FL (ref 37–54)
EOSINOPHIL # BLD AUTO: 0 10*3/MM3 (ref 0–0.4)
EOSINOPHIL NFR BLD AUTO: 0.1 % (ref 0.3–6.2)
ERYTHROCYTE [DISTWIDTH] IN BLOOD BY AUTOMATED COUNT: 16.1 % (ref 12.3–15.4)
FERRITIN SERPL-MCNC: 195 NG/ML (ref 13–150)
GFR SERPL CREATININE-BSD FRML MDRD: 68 ML/MIN/1.73
GLOBULIN UR ELPH-MCNC: 2.5 GM/DL
GLUCOSE BLDC GLUCOMTR-MCNC: 128 MG/DL (ref 70–105)
GLUCOSE BLDC GLUCOMTR-MCNC: 191 MG/DL (ref 70–105)
GLUCOSE BLDC GLUCOMTR-MCNC: 272 MG/DL (ref 70–105)
GLUCOSE BLDC GLUCOMTR-MCNC: 96 MG/DL (ref 70–105)
GLUCOSE SERPL-MCNC: 114 MG/DL (ref 65–99)
HCT VFR BLD AUTO: 35.8 % (ref 34–46.6)
HGB BLD-MCNC: 11.8 G/DL (ref 12–15.9)
LDH SERPL-CCNC: 270 U/L (ref 135–214)
LYMPHOCYTES # BLD AUTO: 1.1 10*3/MM3 (ref 0.7–3.1)
LYMPHOCYTES NFR BLD AUTO: 25 % (ref 19.6–45.3)
MAGNESIUM SERPL-MCNC: 2 MG/DL (ref 1.6–2.4)
MCH RBC QN AUTO: 27.3 PG (ref 26.6–33)
MCHC RBC AUTO-ENTMCNC: 33.1 G/DL (ref 31.5–35.7)
MCV RBC AUTO: 82.5 FL (ref 79–97)
MONOCYTES # BLD AUTO: 0.4 10*3/MM3 (ref 0.1–0.9)
MONOCYTES NFR BLD AUTO: 9.1 % (ref 5–12)
NEUTROPHILS NFR BLD AUTO: 2.8 10*3/MM3 (ref 1.7–7)
NEUTROPHILS NFR BLD AUTO: 65.5 % (ref 42.7–76)
NRBC BLD AUTO-RTO: 0.1 /100 WBC (ref 0–0.2)
PLATELET # BLD AUTO: 190 10*3/MM3 (ref 140–450)
PMV BLD AUTO: 9.8 FL (ref 6–12)
POTASSIUM SERPL-SCNC: 3.9 MMOL/L (ref 3.5–5.2)
PROT SERPL-MCNC: 5.7 G/DL (ref 6–8.5)
QT INTERVAL: 358 MS
RBC # BLD AUTO: 4.35 10*6/MM3 (ref 3.77–5.28)
SODIUM SERPL-SCNC: 139 MMOL/L (ref 136–145)
WBC # BLD AUTO: 4.3 10*3/MM3 (ref 3.4–10.8)

## 2020-11-10 PROCEDURE — 99232 SBSQ HOSP IP/OBS MODERATE 35: CPT | Performed by: INTERNAL MEDICINE

## 2020-11-10 PROCEDURE — 63710000001 INSULIN GLARGINE PER 5 UNITS: Performed by: INTERNAL MEDICINE

## 2020-11-10 PROCEDURE — 80053 COMPREHEN METABOLIC PANEL: CPT | Performed by: INTERNAL MEDICINE

## 2020-11-10 PROCEDURE — 25010000002 DEXAMETHASONE PER 1 MG: Performed by: PHYSICIAN ASSISTANT

## 2020-11-10 PROCEDURE — 85025 COMPLETE CBC W/AUTO DIFF WBC: CPT | Performed by: INTERNAL MEDICINE

## 2020-11-10 PROCEDURE — 82728 ASSAY OF FERRITIN: CPT | Performed by: PHYSICIAN ASSISTANT

## 2020-11-10 PROCEDURE — 82962 GLUCOSE BLOOD TEST: CPT

## 2020-11-10 PROCEDURE — 86140 C-REACTIVE PROTEIN: CPT | Performed by: PHYSICIAN ASSISTANT

## 2020-11-10 PROCEDURE — 83615 LACTATE (LD) (LDH) ENZYME: CPT | Performed by: PHYSICIAN ASSISTANT

## 2020-11-10 PROCEDURE — 85379 FIBRIN DEGRADATION QUANT: CPT | Performed by: PHYSICIAN ASSISTANT

## 2020-11-10 PROCEDURE — 94799 UNLISTED PULMONARY SVC/PX: CPT

## 2020-11-10 PROCEDURE — 94762 N-INVAS EAR/PLS OXIMTRY CONT: CPT

## 2020-11-10 PROCEDURE — 25010000002 ENOXAPARIN PER 10 MG: Performed by: PHYSICIAN ASSISTANT

## 2020-11-10 PROCEDURE — 63710000001 INSULIN LISPRO (HUMAN) PER 5 UNITS: Performed by: PHYSICIAN ASSISTANT

## 2020-11-10 PROCEDURE — 83735 ASSAY OF MAGNESIUM: CPT | Performed by: PHYSICIAN ASSISTANT

## 2020-11-10 RX ORDER — DEXAMETHASONE 6 MG/1
6 TABLET ORAL
Status: DISCONTINUED | OUTPATIENT
Start: 2020-11-11 | End: 2020-11-11 | Stop reason: HOSPADM

## 2020-11-10 RX ORDER — CLONIDINE HYDROCHLORIDE 0.1 MG/1
0.1 TABLET ORAL DAILY
Status: DISCONTINUED | OUTPATIENT
Start: 2020-11-10 | End: 2020-11-11 | Stop reason: HOSPADM

## 2020-11-10 RX ORDER — DEXAMETHASONE SODIUM PHOSPHATE 4 MG/ML
6 INJECTION, SOLUTION INTRA-ARTICULAR; INTRALESIONAL; INTRAMUSCULAR; INTRAVENOUS; SOFT TISSUE DAILY
Status: DISCONTINUED | OUTPATIENT
Start: 2020-11-10 | End: 2020-11-10

## 2020-11-10 RX ADMIN — ALBUTEROL SULFATE 2 PUFF: 90 AEROSOL, METERED RESPIRATORY (INHALATION) at 23:56

## 2020-11-10 RX ADMIN — BUDESONIDE AND FORMOTEROL FUMARATE DIHYDRATE 2 PUFF: 160; 4.5 AEROSOL RESPIRATORY (INHALATION) at 06:38

## 2020-11-10 RX ADMIN — Medication 10 ML: at 10:08

## 2020-11-10 RX ADMIN — ALBUTEROL SULFATE 2 PUFF: 90 AEROSOL, METERED RESPIRATORY (INHALATION) at 06:38

## 2020-11-10 RX ADMIN — CLONIDINE HYDROCHLORIDE 0.1 MG: 0.1 TABLET ORAL at 15:20

## 2020-11-10 RX ADMIN — Medication 10 ML: at 21:08

## 2020-11-10 RX ADMIN — GUAIFENESIN 1200 MG: 600 TABLET, EXTENDED RELEASE ORAL at 10:08

## 2020-11-10 RX ADMIN — ENOXAPARIN SODIUM 40 MG: 40 INJECTION SUBCUTANEOUS at 15:20

## 2020-11-10 RX ADMIN — ALBUTEROL SULFATE 2 PUFF: 90 AEROSOL, METERED RESPIRATORY (INHALATION) at 15:21

## 2020-11-10 RX ADMIN — INSULIN LISPRO 2 UNITS: 100 INJECTION, SOLUTION INTRAVENOUS; SUBCUTANEOUS at 17:51

## 2020-11-10 RX ADMIN — GUAIFENESIN 1200 MG: 600 TABLET, EXTENDED RELEASE ORAL at 21:08

## 2020-11-10 RX ADMIN — AZITHROMYCIN MONOHYDRATE 500 MG: 250 TABLET ORAL at 21:08

## 2020-11-10 RX ADMIN — ASPIRIN 81 MG CHEWABLE TABLET 81 MG: 81 TABLET CHEWABLE at 10:08

## 2020-11-10 RX ADMIN — ALBUTEROL SULFATE 2 PUFF: 90 AEROSOL, METERED RESPIRATORY (INHALATION) at 18:56

## 2020-11-10 RX ADMIN — DEXAMETHASONE SODIUM PHOSPHATE 6 MG: 4 INJECTION, SOLUTION INTRAMUSCULAR; INTRAVENOUS at 10:10

## 2020-11-10 RX ADMIN — BUDESONIDE AND FORMOTEROL FUMARATE DIHYDRATE 2 PUFF: 160; 4.5 AEROSOL RESPIRATORY (INHALATION) at 18:53

## 2020-11-10 RX ADMIN — ALBUTEROL SULFATE 2 PUFF: 90 AEROSOL, METERED RESPIRATORY (INHALATION) at 11:20

## 2020-11-10 RX ADMIN — PANTOPRAZOLE SODIUM 40 MG: 40 TABLET, DELAYED RELEASE ORAL at 10:08

## 2020-11-10 RX ADMIN — INSULIN GLARGINE 4 UNITS: 100 INJECTION, SOLUTION SUBCUTANEOUS at 21:08

## 2020-11-10 RX ADMIN — ATORVASTATIN CALCIUM 40 MG: 40 TABLET, FILM COATED ORAL at 10:08

## 2020-11-10 NOTE — PLAN OF CARE
Goal Outcome Evaluation:  Plan of Care Reviewed With: patient     Outcome Summary: patient currently resting with no new complaints, patient currently on 2L O2, will continue to monitor patinet

## 2020-11-10 NOTE — PLAN OF CARE
Goal Outcome Evaluation:  Plan of Care Reviewed With: patient  Progress: improving  Outcome Summary: Pt resting with no compliants, Weaned off of oxygen with O2 levels of 94-96% on room air. IV decadron changed over to po, Catapres daily added back today.

## 2020-11-10 NOTE — PROGRESS NOTES
[facility] Daily Progress Note      Hospitalist Team  LOS 2 days      Patient Care Team:  Luisa Saavedra APRN as PCP - General  Luisa Saavedra APRN as PCP - Family Medicine    Patient Location: 103/1      Subjective   Subjective     Chief Complaint / Subjective  Chief Complaint   Patient presents with   • Vomiting         Brief Synopsis of Hospital Course/HPI  70-year-old female with multiple comorbidities including diabetes mellitus type 2, GERD,  and hyperlipidemia.  Presented to the emergency room on 11/8/2020 with complaints of progressively worsening cough, chest congestion and shortness of breath x5 to 6 days.  Patient reported that she tested positive for COVID-19 about 4 days ago and was started on azithromycin.  Complained of subjective fever, chills and generalized body weakness.  After evaluating in the ED, patient was admitted for further care.      Date::    11/9/2020  Patient seen and examined  Shortness of breath continues  She is saturating greater than 94% on 2 L of oxygen    11/10/2020  Shortness of breath continues to improve  Complaining of generalized body weakness    Review of Systems   Constitution: Positive for malaise/fatigue. Negative for chills and fever.   Eyes: Negative for blurred vision.   Cardiovascular: Negative for chest pain.   Respiratory: Positive for shortness of breath.    Endocrine: Negative for cold intolerance and heat intolerance.   Gastrointestinal: Negative for abdominal pain, nausea and vomiting.   Genitourinary: Negative for dysuria.   Neurological: Positive for weakness.   Psychiatric/Behavioral: Negative for altered mental status.         Objective   Objective      Vital Signs  Temp:  [97.9 °F (36.6 °C)-98.3 °F (36.8 °C)] 97.9 °F (36.6 °C)  Heart Rate:  [65-87] 76  Resp:  [16-24] 18  BP: (102-155)/(57-76) 129/75  Oxygen Therapy  SpO2: 95 %  Pulse Oximetry Type: Continuous  Device (Oxygen Therapy): nasal  "cannula  Flow (L/min): 2  Oxygen Concentration (%): 28  Flowsheet Rows      First Filed Value   Admission Height  154.9 cm (61\") Documented at 11/08/2020 1303   Admission Weight  92.4 kg (203 lb 11.3 oz) Documented at 11/08/2020 1303        Intake & Output (last 3 days)       11/07 0701 - 11/08 0700 11/08 0701 - 11/09 0700 11/09 0701 - 11/10 0700 11/10 0701 - 11/11 0700    P.O.   760     IV Piggyback  2250      Total Intake(mL/kg)  2250 (24.4) 760 (8.2)     Net  +2250 +760             Urine Unmeasured Occurrence   3 x     Stool Unmeasured Occurrence   1 x         Lines, Drains & Airways    Active LDAs     Name:   Placement date:   Placement time:   Site:   Days:    Peripheral IV 11/08/20 1345 Left Forearm   11/08/20    1345    Forearm   less than 1                  Physical Exam:    Physical Exam  Vitals signs reviewed.   Constitutional:       General: She is not in acute distress.     Appearance: She is obese.   HENT:      Head: Normocephalic and atraumatic.      Nose: Nose normal.      Mouth/Throat:      Mouth: Mucous membranes are moist.   Eyes:      Extraocular Movements: Extraocular movements intact.      Conjunctiva/sclera: Conjunctivae normal.      Pupils: Pupils are equal, round, and reactive to light.   Neck:      Musculoskeletal: Neck supple.   Cardiovascular:      Rate and Rhythm: Normal rate and regular rhythm.   Pulmonary:      Effort: No respiratory distress.      Breath sounds: Normal breath sounds.   Abdominal:      General: Bowel sounds are normal.      Palpations: Abdomen is soft.      Tenderness: There is no abdominal tenderness.   Musculoskeletal: Normal range of motion.   Skin:     General: Skin is warm and dry.   Neurological:      Mental Status: She is alert and oriented to person, place, and time.   Psychiatric:         Mood and Affect: Mood normal.               Procedures:              Results Review:     I reviewed the patient's new clinical results.      Lab Results (last 24 hours)     " Procedure Component Value Units Date/Time    POC Glucose Once [[id number]]  (Normal) Collected: 11/10/20 0730    Specimen: Blood Updated: 11/10/20 0732     Glucose 96 mg/dL      Comment: Serial Number: [id number]:  [id number]       Lactate Dehydrogenase [[id number]]  (Abnormal) Collected: 11/10/20 0342    Specimen: Blood Updated: 11/10/20 0544      U/L     C-reactive Protein [[id number]]  (Abnormal) Collected: 11/10/20 0342    Specimen: Blood Updated: 11/10/20 0544     C-Reactive Protein 2.21 mg/dL     Magnesium [[id number]]  (Normal) Collected: 11/10/20 0342    Specimen: Blood Updated: 11/10/20 0507     Magnesium 2.0 mg/dL     Ferritin [[id number]]  (Abnormal) Collected: 11/10/20 0342    Specimen: Blood Updated: 11/10/20 0451     Ferritin 195.00 ng/mL     Narrative:      Results may be falsely decreased if patient taking Biotin.      Comprehensive Metabolic Panel [[id number]]  (Abnormal) Collected: 11/10/20 0342    Specimen: Blood Updated: 11/10/20 0447     Glucose 114 mg/dL      BUN 19 mg/dL      Creatinine 0.83 mg/dL      Sodium 139 mmol/L      Potassium 3.9 mmol/L      Chloride 103 mmol/L      CO2 24.0 mmol/L      Calcium 8.1 mg/dL      Total Protein 5.7 g/dL      Albumin 3.20 g/dL      ALT (SGPT) 30 U/L      AST (SGOT) 29 U/L      Alkaline Phosphatase 56 U/L      Total Bilirubin 0.3 mg/dL      eGFR Non African Amer 68 mL/min/1.73      Globulin 2.5 gm/dL      A/G Ratio 1.3 g/dL      BUN/Creatinine Ratio 22.9     Anion Gap 12.0 mmol/L     Narrative:      GFR Normal >60  Chronic Kidney Disease <60  Kidney Failure <15      CBC & Differential [[id number]]  (Abnormal) Collected: 11/10/20 0342    Specimen: Blood Updated: 11/10/20 0429    Narrative:      The following orders were created for panel order CBC & Differential.  Procedure                               Abnormality         Status                     ---------                               -----------         ------                     CBC Auto  Differential[[id number]]        Abnormal            Final result                 Please view results for these tests on the individual orders.    CBC Auto Differential [[id number]]  (Abnormal) Collected: 11/10/20 0342    Specimen: Blood Updated: 11/10/20 0429     WBC 4.30 10*3/mm3      RBC 4.35 10*6/mm3      Hemoglobin 11.8 g/dL      Hematocrit 35.8 %      MCV 82.5 fL      MCH 27.3 pg      MCHC 33.1 g/dL      RDW 16.1 %      RDW-SD 46.4 fl      MPV 9.8 fL      Platelets 190 10*3/mm3      Neutrophil % 65.5 %      Lymphocyte % 25.0 %      Monocyte % 9.1 %      Eosinophil % 0.1 %      Basophil % 0.3 %      Neutrophils, Absolute 2.80 10*3/mm3      Lymphocytes, Absolute 1.10 10*3/mm3      Monocytes, Absolute 0.40 10*3/mm3      Eosinophils, Absolute 0.00 10*3/mm3      Basophils, Absolute 0.00 10*3/mm3      nRBC 0.1 /100 WBC     S. Pneumo Ag Urine or CSF - Urine, Urine, Clean Catch [[id number]]  (Normal) Collected: 11/09/20 2245    Specimen: Urine, Clean Catch Updated: 11/09/20 2317     Strep Pneumo Ag Negative    Legionella Antigen, Urine - Urine, Urine, Clean Catch [[id number]]  (Normal) Collected: 11/09/20 2245    Specimen: Urine, Clean Catch Updated: 11/09/20 2317     LEGIONELLA ANTIGEN, URINE Negative    POC Glucose Once [[id number]]  (Abnormal) Collected: 11/09/20 1954    Specimen: Blood Updated: 11/09/20 1955     Glucose 206 mg/dL      Comment: Serial Number: [id number]:  [id number]       POC Glucose Once [[id number]]  (Abnormal) Collected: 11/09/20 1649    Specimen: Blood Updated: 11/09/20 1653     Glucose 143 mg/dL      Comment: Serial Number: [id number]:  [id number]       Lactate Dehydrogenase [[id number]]  (Abnormal) Collected: 11/09/20 1424    Specimen: Blood Updated: 11/09/20 1502      U/L     Blood Culture - Blood, Arm, Left [[id number]] Collected: 11/08/20 1347    Specimen: Blood from Arm, Left Updated: 11/09/20 1401     Blood Culture No growth at 24 hours    Blood Culture - Blood, Arm, Right  [[id number]] Collected: 11/08/20 1347    Specimen: Blood from Arm, Right Updated: 11/09/20 1401     Blood Culture No growth at 24 hours    Ferritin [[id number]]  (Abnormal) Collected: 11/09/20 1213    Specimen: Blood Updated: 11/09/20 1306     Ferritin 226.40 ng/mL     Narrative:      Results may be falsely decreased if patient taking Biotin.      Lactate Dehydrogenase [[id number]]  (Abnormal) Collected: 11/09/20 1213    Specimen: Blood Updated: 11/09/20 1305      U/L     Basic Metabolic Panel [[id number]]  (Abnormal) Collected: 11/09/20 1213    Specimen: Blood Updated: 11/09/20 1305     Glucose 130 mg/dL      BUN 21 mg/dL      Creatinine 0.68 mg/dL      Sodium 132 mmol/L      Potassium 3.6 mmol/L      Chloride 96 mmol/L      CO2 23.0 mmol/L      Calcium 8.1 mg/dL      eGFR Non African Amer 85 mL/min/1.73      BUN/Creatinine Ratio 30.9     Anion Gap 13.0 mmol/L     Narrative:      GFR Normal >60  Chronic Kidney Disease <60  Kidney Failure <15      Magnesium [[id number]]  (Abnormal) Collected: 11/09/20 1213    Specimen: Blood Updated: 11/09/20 1305     Magnesium 1.5 mg/dL     C-reactive Protein [[id number]]  (Abnormal) Collected: 11/09/20 1213    Specimen: Blood Updated: 11/09/20 1305     C-Reactive Protein 3.93 mg/dL     D-dimer, Quantitative [[id number]]  (Normal) Collected: 11/09/20 1213    Specimen: Blood Updated: 11/09/20 1244     D-Dimer, Quantitative 0.29 mg/L (FEU)     Narrative:      Reference Range  --------------------------------------------------------------------     < 0.50   Negative Predictive Value  0.50-0.59   Indeterminate    >= 0.60   Probable VTE             A very low percentage of patients with DVT may yield D-Dimer results   below the cut-off of 0.50 mg/L FEU.  This is known to be more   prevalent in patients with distal DVT.             Results of this test should always be interpreted in conjunction with   the patient's medical history, clinical presentation and other   findings.  Clinical  diagnosis should not be based on the result of   [place] D-Dimer alone.    CBC Auto Differential [[id number]]  (Abnormal) Collected: 11/09/20 1213    Specimen: Blood Updated: 11/09/20 1238     WBC 2.10 10*3/mm3      RBC 4.42 10*6/mm3      Hemoglobin 11.7 g/dL      Hematocrit 36.1 %      MCV 81.8 fL      MCH 26.5 pg      MCHC 32.4 g/dL      RDW 16.3 %      RDW-SD 46.8 fl      MPV 9.0 fL      Platelets 167 10*3/mm3      Neutrophil % 56.5 %      Lymphocyte % 36.6 %      Monocyte % 6.0 %      Eosinophil % 0.3 %      Basophil % 0.6 %      Neutrophils, Absolute 1.20 10*3/mm3      Lymphocytes, Absolute 0.80 10*3/mm3      Monocytes, Absolute 0.10 10*3/mm3      Eosinophils, Absolute 0.00 10*3/mm3      Basophils, Absolute 0.00 10*3/mm3      nRBC 0.3 /100 WBC     Hemoglobin A1c [[id number]]  (Abnormal) Collected: 11/09/20 0001    Specimen: Blood Updated: 11/09/20 1108     Hemoglobin A1C 6.4 %     Narrative:      Hemoglobin A1C Reference Range:    <5.7 %        Normal  5.7-6.4 %     Increased risk for diabetes  > 6.4 %        Diabetes       These guidelines have been recommended by the American Diabetic Association for Hgb A1c.      The following 2010 guidelines have been recommended by the American Diabetes Association for Hemoglobin A1c.    HBA1c 5.7-6.4% Increased risk for future diabetes (pre-diabetes)  HBA1c     >6.4% Diabetes          Hemoglobin A1C   Date Value Ref Range Status   11/09/2020 6.4 (H) 3.5 - 5.6 % Final               Lab Results   Component Value Date    LIPASE 100 (H) 11/08/2020     Lab Results   Component Value Date    CHOL 140 05/10/2018    TRIG 115 05/10/2018    HDL 40 05/10/2018    LDL 86 05/10/2018       No results found for: INTRAOP, PREDX, FINALDX, COMDX    Microbiology Results (last 10 days)     Procedure Component Value - Date/Time    Legionella Antigen, Urine - Urine, Urine, Clean Catch [[id number]]  (Normal) Collected: 11/09/20 2245    Lab Status: Final result Specimen: Urine, Clean Catch Updated:  11/09/20 2317     LEGIONELLA ANTIGEN, URINE Negative    S. Pneumo Ag Urine or CSF - Urine, Urine, Clean Catch [[id number]]  (Normal) Collected: 11/09/20 2245    Lab Status: Final result Specimen: Urine, Clean Catch Updated: 11/09/20 2317     Strep Pneumo Ag Negative    Respiratory Panel PCR w/COVID-19(SARS-CoV-2) KATELYN/LIDYA/MARTINEZ/PAD/COR/MAD/JONO In-House, NP Swab in UTM/VTM, 3-4 HR TAT - Swab, Nasopharynx [[id number]]  (Abnormal) Collected: 11/09/20 0001    Lab Status: Final result Specimen: Swab from Nasopharynx Updated: 11/09/20 0131     ADENOVIRUS, PCR Not Detected     Coronavirus 229E Not Detected     Coronavirus HKU1 Not Detected     Coronavirus NL63 Not Detected     Coronavirus OC43 Not Detected     COVID19 Detected     Human Metapneumovirus Not Detected     Human Rhinovirus/Enterovirus Not Detected     Influenza A PCR Not Detected     Influenza A H1 Not Detected     Influenza A H1 2009 PCR Not Detected     Influenza A H3 Not Detected     Influenza B PCR Not Detected     Parainfluenza Virus 1 Not Detected     Parainfluenza Virus 2 Not Detected     Parainfluenza Virus 3 Not Detected     Parainfluenza Virus 4 Not Detected     RSV, PCR Not Detected     Bordetella pertussis pcr Not Detected     Bordetella parapertussis PCR Not Detected     Chlamydophila pneumoniae PCR Not Detected     Mycoplasma pneumo by PCR Not Detected    Narrative:      Fact sheet for providers: https://docs.[employer]/wp-content/uploads/PZY5674-7778-CB0.1-EUA-Provider-Fact-Sheet-3.pdf    Fact sheet for patients: https://docs.[employer]/wp-content/uploads/ULF1811-6056-XB0.1-EUA-Patient-Fact-Sheet-1.pdf    Blood Culture - Blood, Arm, Left [[id number]] Collected: 11/08/20 1347    Lab Status: Preliminary result Specimen: Blood from Arm, Left Updated: 11/09/20 1401     Blood Culture No growth at 24 hours    Blood Culture - Blood, Arm, Right [[id number]] Collected: 11/08/20 1347    Lab Status: Preliminary result Specimen: Blood from Arm, Right  Updated: 11/09/20 1401     Blood Culture No growth at 24 hours          ECG/EMG Results (most recent)     Procedure Component Value Units Date/Time    ECG 12 Lead [[id number]] Collected: 11/08/20 1354     Updated: 11/08/20 1412     QT Interval 358 ms     Narrative:      HEART RATE= 84  bpm  RR Interval= 720  ms  OR Interval= 138  ms  P Horizontal Axis= 7  deg  P Front Axis= 73  deg  QRSD Interval= 73  ms  QT Interval= 358  ms  QRS Axis= 12  deg  T Wave Axis= 47  deg  - NORMAL ECG -  Sinus rhythm  When compared with ECG of 11-May-2019 18:56:26,  Significant rate decrease  Electronically Signed By:   Date and Time of Study: 2020-11-08 13:54:05                    Ct Abdomen Pelvis Without Contrast    Result Date: 11/8/2020  1.No acute process identified within abdomen/pelivs. 2.Within the visualized lung bases are a couple scattered groundglass densities, suggesting a mild infectious or inflammatory process.    Electronically Signed By-DR. Rodney Escudero MD On:11/8/2020 4:01 PM This report was finalized on [id number] by DR. Rodney Escudero MD.    Xr Chest 1 View    Result Date: 11/8/2020  No acute cardiopulmonary process identified.  Electronically Signed By-DR. Rodney Escudero MD On:11/8/2020 1:43 PM This report was finalized on [id number] by DR. Rodney Escudero MD.          Xrays, labs reviewed personally by physician.    Medication Review:   I have reviewed the patient's current medication list      Scheduled Meds  albuterol sulfate HFA, 2 puff, Inhalation, Q4H - RT  aspirin, 81 mg, Oral, Daily  atorvastatin, 40 mg, Oral, Daily  azithromycin, 500 mg, Oral, Q24H  budesonide-formoterol, 2 puff, Inhalation, BID - RT  dexamethasone, 6 mg, Intravenous, Daily  enoxaparin, 40 mg, Subcutaneous, Daily  guaiFENesin, 1,200 mg, Oral, Q12H  insulin glargine, 4 Units, Subcutaneous, Nightly  insulin lispro, 0-9 Units, Subcutaneous, TID AC  pantoprazole, 40 mg, Oral, QAM AC  sodium chloride, 10 mL, Intravenous,  Q12H        Meds Infusions  Pharmacy to Dose enoxaparin (LOVENOX),         Meds PRN  •  acetaminophen **OR** acetaminophen **OR** acetaminophen  •  albuterol sulfate HFA  •  aluminum-magnesium hydroxide-simethicone  •  benzonatate  •  bisacodyl  •  dextrose  •  dextrose  •  glucagon (human recombinant)  •  insulin lispro **AND** insulin lispro  •  magnesium hydroxide  •  magnesium sulfate **OR** magnesium sulfate in D5W 1g/100mL (PREMIX)  •  melatonin  •  nitroglycerin  •  ondansetron **OR** ondansetron  •  ondansetron ODT  •  Pharmacy to Dose enoxaparin (LOVENOX)  •  potassium chloride  •  [COMPLETED] Insert peripheral IV **AND** sodium chloride  •  sodium chloride        Assessment/Plan   Assessment/Plan     Active Hospital Problems    Diagnosis  POA   • **COVID-19 virus infection [U07.1]  Yes   • Nausea and vomiting [R11.2]  Yes   • Elevated lipase [R74.8]  Yes   • Hypotension [I95.9]  Yes   • CKD (chronic kidney disease), stage III [N18.30]  Yes   • CAD (coronary artery disease) [I25.10]  Yes   • Gastroesophageal reflux disease [K21.9]  Yes   • Obesity (BMI 30-39.9) [E66.9]  Yes   • Acute respiratory distress [R06.03]  Yes   • COPD exacerbation (CMS/HCC) [J44.1]  Yes   • Hyperlipidemia [E78.5]  Yes   • Diabetes mellitus (CMS/HCC) [E11.9]  Yes   • Hypertension [I10]  Yes      Resolved Hospital Problems   No resolved problems to display.       MEDICAL DECISION MAKING COMPLEXITY BY PROBLEM:     Covid 19 infection  Chest x-ray reviewed-showed no acute cardiopulmonary abnormality  CT abdomen/pelvis showed findings suggestive of infectious/inflammatory process in the lung bases.  Monitor inflammatory markers  On Decadron and azithromycin  Oxygen therapy and titration    Reported diarrhea, nausea and vomiting  Probably due to the above  Resolved  Continue supportive care     Acute kidney failure  Most likely due to dehydration from nausea and vomiting with diarrhea  In the setting of HCTZ, Metformin and valsartan    Use  improved with IV fluid     Hyperlipidemia-on statin     Essential hypertension  Was noted to be hypotensive on presentation  Blood pressure has improved  To avoid rebound hypertension-restarted on clonidine  Continue to hold his HCTZ and valsartan      Diabetes mellitus type 2  A1c 6.4  Hold Metformin  On Premeal insulin and sliding scale insulin  Start on low dose Lantus at bedtime    GERD-on PPI     Obesity, BMI 38.49  -Encourage lifestyle modifications           VTE Prophylaxis -on Lovenox    Mechanical Order History:     None      Pharmalogical Order History:      Ordered     Dose Route Frequency Stop    11/09/20 0119  enoxaparin (LOVENOX) syringe 40 mg      40 mg SC Daily --    11/08/20 2340  Pharmacy to Dose enoxaparin (LOVENOX)     Question:  Indication of use  Answer:  Prophylaxis    -- XX Continuous PRN --                  Code Status -   Code Status and Medical Interventions:   Ordered at: 11/08/20 2126     Level Of Support Discussed With:    Patient     Code Status:    CPR     Medical Interventions (Level of Support Prior to Arrest):    Full       This patient has been examined with appropriate PPE. 11/10/20        Discharge Planning            Electronically signed by Martin Morris MD, 11/10/20, 09:30 EST.  [facility] Hospitalist Team

## 2020-11-11 ENCOUNTER — READMISSION MANAGEMENT (OUTPATIENT)
Dept: CALL CENTER | Facility: HOSPITAL | Age: 70
End: 2020-11-11

## 2020-11-11 VITALS
HEIGHT: 61 IN | DIASTOLIC BLOOD PRESSURE: 70 MMHG | BODY MASS INDEX: 38.71 KG/M2 | HEART RATE: 82 BPM | WEIGHT: 205.03 LBS | RESPIRATION RATE: 16 BRPM | SYSTOLIC BLOOD PRESSURE: 111 MMHG | OXYGEN SATURATION: 95 % | TEMPERATURE: 98 F

## 2020-11-11 LAB
ALBUMIN SERPL-MCNC: 3.2 G/DL (ref 3.5–5.2)
ALBUMIN/GLOB SERPL: 1.3 G/DL
ALP SERPL-CCNC: 57 U/L (ref 39–117)
ALT SERPL W P-5'-P-CCNC: 28 U/L (ref 1–33)
ANION GAP SERPL CALCULATED.3IONS-SCNC: 11 MMOL/L (ref 5–15)
AST SERPL-CCNC: 25 U/L (ref 1–32)
BASOPHILS # BLD AUTO: 0 10*3/MM3 (ref 0–0.2)
BASOPHILS NFR BLD AUTO: 0.2 % (ref 0–1.5)
BILIRUB SERPL-MCNC: 0.3 MG/DL (ref 0–1.2)
BUN SERPL-MCNC: 17 MG/DL (ref 8–23)
BUN/CREAT SERPL: 25.8 (ref 7–25)
CALCIUM SPEC-SCNC: 8 MG/DL (ref 8.6–10.5)
CHLORIDE SERPL-SCNC: 106 MMOL/L (ref 98–107)
CO2 SERPL-SCNC: 24 MMOL/L (ref 22–29)
CREAT SERPL-MCNC: 0.66 MG/DL (ref 0.57–1)
DEPRECATED RDW RBC AUTO: 46.4 FL (ref 37–54)
EOSINOPHIL # BLD AUTO: 0 10*3/MM3 (ref 0–0.4)
EOSINOPHIL NFR BLD AUTO: 0.1 % (ref 0.3–6.2)
ERYTHROCYTE [DISTWIDTH] IN BLOOD BY AUTOMATED COUNT: 16.2 % (ref 12.3–15.4)
GFR SERPL CREATININE-BSD FRML MDRD: 88 ML/MIN/1.73
GLOBULIN UR ELPH-MCNC: 2.5 GM/DL
GLUCOSE BLDC GLUCOMTR-MCNC: 110 MG/DL (ref 70–105)
GLUCOSE BLDC GLUCOMTR-MCNC: 85 MG/DL (ref 70–105)
GLUCOSE SERPL-MCNC: 100 MG/DL (ref 65–99)
HCT VFR BLD AUTO: 34.5 % (ref 34–46.6)
HGB BLD-MCNC: 11.1 G/DL (ref 12–15.9)
LYMPHOCYTES # BLD AUTO: 1 10*3/MM3 (ref 0.7–3.1)
LYMPHOCYTES NFR BLD AUTO: 17.5 % (ref 19.6–45.3)
MCH RBC QN AUTO: 26.7 PG (ref 26.6–33)
MCHC RBC AUTO-ENTMCNC: 32.1 G/DL (ref 31.5–35.7)
MCV RBC AUTO: 83 FL (ref 79–97)
MONOCYTES # BLD AUTO: 0.4 10*3/MM3 (ref 0.1–0.9)
MONOCYTES NFR BLD AUTO: 7.1 % (ref 5–12)
NEUTROPHILS NFR BLD AUTO: 4.3 10*3/MM3 (ref 1.7–7)
NEUTROPHILS NFR BLD AUTO: 75.1 % (ref 42.7–76)
NRBC BLD AUTO-RTO: 0.1 /100 WBC (ref 0–0.2)
PLATELET # BLD AUTO: 235 10*3/MM3 (ref 140–450)
PMV BLD AUTO: 9.6 FL (ref 6–12)
POTASSIUM SERPL-SCNC: 3.8 MMOL/L (ref 3.5–5.2)
PROT SERPL-MCNC: 5.7 G/DL (ref 6–8.5)
RBC # BLD AUTO: 4.15 10*6/MM3 (ref 3.77–5.28)
SODIUM SERPL-SCNC: 141 MMOL/L (ref 136–145)
WBC # BLD AUTO: 5.7 10*3/MM3 (ref 3.4–10.8)

## 2020-11-11 PROCEDURE — 94799 UNLISTED PULMONARY SVC/PX: CPT

## 2020-11-11 PROCEDURE — 82962 GLUCOSE BLOOD TEST: CPT

## 2020-11-11 PROCEDURE — 94762 N-INVAS EAR/PLS OXIMTRY CONT: CPT

## 2020-11-11 PROCEDURE — 99239 HOSP IP/OBS DSCHRG MGMT >30: CPT | Performed by: INTERNAL MEDICINE

## 2020-11-11 PROCEDURE — 80053 COMPREHEN METABOLIC PANEL: CPT | Performed by: INTERNAL MEDICINE

## 2020-11-11 PROCEDURE — 85025 COMPLETE CBC W/AUTO DIFF WBC: CPT | Performed by: INTERNAL MEDICINE

## 2020-11-11 PROCEDURE — 94618 PULMONARY STRESS TESTING: CPT

## 2020-11-11 RX ORDER — GUAIFENESIN 600 MG/1
1200 TABLET, EXTENDED RELEASE ORAL EVERY 12 HOURS SCHEDULED
Qty: 28 TABLET | Refills: 0 | Status: SHIPPED | OUTPATIENT
Start: 2020-11-11 | End: 2020-11-18

## 2020-11-11 RX ORDER — ALBUTEROL SULFATE 90 UG/1
2 AEROSOL, METERED RESPIRATORY (INHALATION) EVERY 4 HOURS PRN
Qty: 6.7 G | Refills: 0 | Status: SHIPPED | OUTPATIENT
Start: 2020-11-11

## 2020-11-11 RX ORDER — DEXAMETHASONE 6 MG/1
6 TABLET ORAL
Qty: 5 TABLET | Refills: 0 | Status: SHIPPED | OUTPATIENT
Start: 2020-11-12 | End: 2020-11-17

## 2020-11-11 RX ADMIN — ATORVASTATIN CALCIUM 40 MG: 40 TABLET, FILM COATED ORAL at 09:47

## 2020-11-11 RX ADMIN — Medication 10 ML: at 09:47

## 2020-11-11 RX ADMIN — CLONIDINE HYDROCHLORIDE 0.1 MG: 0.1 TABLET ORAL at 09:47

## 2020-11-11 RX ADMIN — ALBUTEROL SULFATE 2 PUFF: 90 AEROSOL, METERED RESPIRATORY (INHALATION) at 10:58

## 2020-11-11 RX ADMIN — GUAIFENESIN 1200 MG: 600 TABLET, EXTENDED RELEASE ORAL at 09:47

## 2020-11-11 RX ADMIN — BUDESONIDE AND FORMOTEROL FUMARATE DIHYDRATE 2 PUFF: 160; 4.5 AEROSOL RESPIRATORY (INHALATION) at 08:33

## 2020-11-11 RX ADMIN — ASPIRIN 81 MG CHEWABLE TABLET 81 MG: 81 TABLET CHEWABLE at 09:47

## 2020-11-11 RX ADMIN — ALBUTEROL SULFATE 2 PUFF: 90 AEROSOL, METERED RESPIRATORY (INHALATION) at 08:32

## 2020-11-11 RX ADMIN — PANTOPRAZOLE SODIUM 40 MG: 40 TABLET, DELAYED RELEASE ORAL at 09:47

## 2020-11-11 RX ADMIN — ALBUTEROL SULFATE 2 PUFF: 90 AEROSOL, METERED RESPIRATORY (INHALATION) at 03:30

## 2020-11-11 RX ADMIN — DEXAMETHASONE 6 MG: 6 TABLET ORAL at 09:47

## 2020-11-11 NOTE — PLAN OF CARE
Goal Outcome Evaluation:  Plan of Care Reviewed With: patient  Progress: improving  Outcome Summary: Pt doing well today. No complaints of shortness of air, pt is on room air. pt will be discharged home today.

## 2020-11-11 NOTE — PROGRESS NOTES
Continued Stay Note  EVA Gardner     Patient Name: Zahra Albrecht  MRN: [id number]  Today's Date: 11/11/2020    Admit Date: 11/8/2020    Discharge Plan     Row Name 11/11/20 1350       Plan    Plan  Anticiapte routine home    Plan Comments  Did not qualify for home oxygen on 11/11. Discharge orders noted.    Final Discharge Disposition Code  01 - home or self-care    Final Note  Home        Expected Discharge Date and Time     Expected Discharge Date Expected Discharge Time    Nov 11, 2020             Analy Larose RN

## 2020-11-11 NOTE — DISCHARGE INSTRUCTIONS

## 2020-11-11 NOTE — PLAN OF CARE
Problem: Adult Inpatient Plan of Care  Goal: Plan of Care Review  Outcome: Ongoing, Progressing  Flowsheets (Taken 11/11/2020 0325)  Progress: improving  Plan of Care Reviewed With: patient  Outcome Summary: Pt resting this evening. Continues to do well on room air. VSS, will continue to monitor  Goal: Patient-Specific Goal (Individualized)  Outcome: Ongoing, Progressing  Goal: Absence of Hospital-Acquired Illness or Injury  Outcome: Ongoing, Progressing  Intervention: Identify and Manage Fall Risk  Recent Flowsheet Documentation  Taken 11/11/2020 0312 by Mary Valdes RN  Safety Promotion/Fall Prevention:   assistive device/personal items within reach   clutter free environment maintained   lighting adjusted   nonskid shoes/slippers when out of bed   safety round/check completed   room organization consistent  Intervention: Prevent Skin Injury  Recent Flowsheet Documentation  Taken 11/11/2020 0312 by Mary Valdes RN  Body Position: position changed independently  Intervention: Prevent Infection  Recent Flowsheet Documentation  Taken 11/11/2020 0312 by Mary Valdes RN  Infection Prevention:   equipment surfaces disinfected   hand hygiene promoted   personal protective equipment utilized   rest/sleep promoted   single patient room provided   visitors restricted/screened  Goal: Optimal Comfort and Wellbeing  Outcome: Ongoing, Progressing  Goal: Readiness for Transition of Care  Outcome: Ongoing, Progressing     Problem: Respiratory Compromise (Pneumonia)  Goal: Effective Oxygenation and Ventilation  Outcome: Ongoing, Progressing  Intervention: Optimize Oxygenation and Ventilation  Recent Flowsheet Documentation  Taken 11/11/2020 0312 by Mary Valdes RN  Head of Bed (HOB): HOB elevated   Goal Outcome Evaluation:  Plan of Care Reviewed With: patient  Progress: improving  Outcome Summary: Pt resting this evening. Continues to do well on room air. VSS, will continue to monitor

## 2020-11-11 NOTE — PROGRESS NOTES
Exercise Oximetry    Patient Name:Zahra Albrecht   MRN: [id number]   Date: 11/11/20             ROOM AIR BASELINE   SpO2%                       93   Heart Rate                  89   Blood Pressure      EXERCISE ON ROOM AIR SpO2% EXERCISE ON O2 @  LPM SpO2%   1 MINUTE 93 1 MINUTE    2 MINUTES 92 2 MINUTES    3 MINUTES 93 3 MINUTES    4 MINUTES 92 4 MINUTES    5 MINUTES 91 5 MINUTES    6 MINUTES 90 6 MINUTES               Distance Walked                   100 ft  Distance Walked   Dyspnea (Laura Scale)   Dyspnea (Laura Scale)   Fatigue (Laura Scale)   Fatigue (Laura Scale)   SpO2% Post Exercise              96% SpO2% Post Exercise   HR Post Exercise                      105 HR Post Exercise   Time to Recovery                       1 min  Time to Recovery     Comments: walked pt in room at her own pace.  Pt did not drop below 90%. No O2 required at this time

## 2020-11-11 NOTE — DISCHARGE SUMMARY
[facility]  DISCHARGE SUMMARY        Prepared For PCP:  Luisa Saavedra APRN    Patient Name: Zahra Albrecht  : 1950  MRN: [id number]      Date of Admission:   2020    Date of Discharge:  2020    Length of stay:  LOS: 3 days     Hospital Course     Presenting Problem:   Acute respiratory distress [R06.03]  Chronic obstructive pulmonary disease with acute exacerbation (CMS/HCC) [J44.1]  Pneumonia of both lungs due to infectious organism, unspecified part of lung [J18.9]  COVID-19 [U07.1]      Active Hospital Problems    Diagnosis  POA   • **COVID-19 virus infection [U07.1]  Yes   • Nausea and vomiting [R11.2]  Yes   • Elevated lipase [R74.8]  Yes   • Hypotension [I95.9]  Yes   • CKD (chronic kidney disease), stage III [N18.30]  Yes   • CAD (coronary artery disease) [I25.10]  Yes   • Gastroesophageal reflux disease [K21.9]  Yes   • Obesity (BMI 30-39.9) [E66.9]  Yes   • Acute respiratory distress [R06.03]  Yes   • COPD exacerbation (CMS/HCC) [J44.1]  Yes   • Hyperlipidemia [E78.5]  Yes   • Diabetes mellitus (CMS/HCC) [E11.9]  Yes   • Hypertension [I10]  Yes      Resolved Hospital Problems   No resolved problems to display.           Hospital Course:  70-year-old female with multiple comorbidities including diabetes mellitus type 2, GERD,  and hyperlipidemia.  Presented to the emergency room on 2020 with complaints of progressively worsening cough, chest congestion and shortness of breath x 5 to 6 days.  Patient reported that she tested positive for COVID-19 about 4 days ago and was started on azithromycin.  Complained of subjective fever, chills and generalized body weakness.  After evaluating in the ED, patient was admitted for further care.    Conditions addressed while inpatient are as stated below    Covid 19 infection  Chest x-ray reviewed-showed no acute cardiopulmonary abnormality  CT abdomen/pelvis showed findings suggestive of  infectious/inflammatory process in the lung bases.  Continue on Decadron  Patient has been weaned off oxygen  Does not qualify for home oxygen       Reported diarrhea, nausea and vomiting  Probably due to the above  Resolved       Acute kidney failure  Most likely due to dehydration from nausea and vomiting with diarrhea  In the setting of HCTZ, Metformin and valsartan   Use  improved with IV fluid     Hyperlipidemia-on statin     Essential hypertension  Was noted to be hypotensive on presentation  Blood pressure has improved  To avoid rebound hypertension-restarted on clonidine  Discontinued HCTZ and valsartan with this admission  Patient to follow-up with PCP for blood pressure check        Diabetes mellitus type 2  A1c 6.4  On Metformin       GERD-on PPI     Obesity, BMI 38.49  -Encourage lifestyle modifications               Recommendation for Outpatient Providers:             Reasons For Change In Medications and Indications for New Medications:        Day of Discharge     HPI:       Vital Signs:   Temp:  [98 °F (36.7 °C)-98.3 °F (36.8 °C)] 98 °F (36.7 °C)  Heart Rate:  [] 82  Resp:  [16-18] 16  BP: (110-138)/(66-80) 111/70     Physical Exam:  Physical Exam  Vitals signs reviewed.   Constitutional:       General: She is not in acute distress.  HENT:      Head: Normocephalic and atraumatic.      Nose: Nose normal.      Mouth/Throat:      Mouth: Mucous membranes are moist.   Eyes:      Extraocular Movements: Extraocular movements intact.      Conjunctiva/sclera: Conjunctivae normal.      Pupils: Pupils are equal, round, and reactive to light.   Neck:      Musculoskeletal: Neck supple.   Cardiovascular:      Rate and Rhythm: Normal rate and regular rhythm.   Pulmonary:      Effort: Pulmonary effort is normal. No respiratory distress.   Abdominal:      General: Bowel sounds are normal.      Palpations: Abdomen is soft.      Tenderness: There is no abdominal tenderness.   Musculoskeletal: Normal range of  motion.   Skin:     General: Skin is warm and dry.   Neurological:      General: No focal deficit present.      Mental Status: She is alert and oriented to person, place, and time.   Psychiatric:         Mood and Affect: Mood normal.         Pertinent  and/or Most Recent Results     Results from last 7 days   Lab Units 11/11/20  0705 11/10/20  0342 11/09/20  1213 11/08/20  1346   WBC 10*3/mm3 5.70 4.30 2.10* 4.60   HEMOGLOBIN g/dL 11.1* 11.8* 11.7* 12.9   HEMATOCRIT % 34.5 35.8 36.1 39.3   PLATELETS 10*3/mm3 235 190 167 183   SODIUM mmol/L 141 139 132* 134*   POTASSIUM mmol/L 3.8 3.9 3.6 3.7   CHLORIDE mmol/L 106 103 96* 95*   CO2 mmol/L 24.0 24.0 23.0 24.0   BUN mg/dL 17 19 21 25*   CREATININE mg/dL 0.66 0.83 0.68 1.13*   GLUCOSE mg/dL 100* 114* 130* 89   CALCIUM mg/dL 8.0* 8.1* 8.1* 8.7     Results from last 7 days   Lab Units 11/11/20  0705 11/10/20  0342 11/08/20  1346   BILIRUBIN mg/dL 0.3 0.3 0.4   ALK PHOS U/L 57 56 67   ALT (SGPT) U/L 28 30 34*   AST (SGOT) U/L 25 29 35*           Invalid input(s): TG, LDLCALC, LDLREALC  Results from last 7 days   Lab Units 11/09/20  0001 11/08/20  1346 11/08/20  1345   HEMOGLOBIN A1C % 6.4*  --   --    TROPONIN T ng/mL  --  <0.010  --    PROCALCITONIN ng/mL 0.07  --   --    LACTATE mmol/L  --   --  1.0       Brief Urine Lab Results  (Last result in the past 365 days)      Color   Clarity   Blood   Leuk Est   Nitrite   Protein   CREAT   Urine HCG        11/08/20 1451 Dark Yellow  Comment:  Result checked  Cloudy  Comment:  Result checked  Negative Negative Negative 100 mg/dL (2+)               Microbiology Results Abnormal     Procedure Component Value - Date/Time    Blood Culture - Blood, Arm, Left [[id number]] Collected: 11/08/20 1347    Lab Status: Preliminary result Specimen: Blood from Arm, Left Updated: 11/10/20 1400     Blood Culture No growth at 2 days    Blood Culture - Blood, Arm, Right [[id number]] Collected: 11/08/20 1347    Lab Status: Preliminary result Specimen:  Blood from Arm, Right Updated: 11/10/20 1400     Blood Culture No growth at 2 days    S. Pneumo Ag Urine or CSF - Urine, Urine, Clean Catch [[id number]]  (Normal) Collected: 11/09/20 2245    Lab Status: Final result Specimen: Urine, Clean Catch Updated: 11/09/20 2317     Strep Pneumo Ag Negative    Legionella Antigen, Urine - Urine, Urine, Clean Catch [[id number]]  (Normal) Collected: 11/09/20 2245    Lab Status: Final result Specimen: Urine, Clean Catch Updated: 11/09/20 2317     LEGIONELLA ANTIGEN, URINE Negative    Respiratory Panel PCR w/COVID-19(SARS-CoV-2) KATELYN/LIDYA/MARTINEZ/PAD/COR/MAD/JONO In-House, NP Swab in UTM/VTM, 3-4 HR TAT - Swab, Nasopharynx [[id number]]  (Abnormal) Collected: 11/09/20 0001    Lab Status: Final result Specimen: Swab from Nasopharynx Updated: 11/09/20 0131     ADENOVIRUS, PCR Not Detected     Coronavirus 229E Not Detected     Coronavirus HKU1 Not Detected     Coronavirus NL63 Not Detected     Coronavirus OC43 Not Detected     COVID19 Detected     Human Metapneumovirus Not Detected     Human Rhinovirus/Enterovirus Not Detected     Influenza A PCR Not Detected     Influenza A H1 Not Detected     Influenza A H1 2009 PCR Not Detected     Influenza A H3 Not Detected     Influenza B PCR Not Detected     Parainfluenza Virus 1 Not Detected     Parainfluenza Virus 2 Not Detected     Parainfluenza Virus 3 Not Detected     Parainfluenza Virus 4 Not Detected     RSV, PCR Not Detected     Bordetella pertussis pcr Not Detected     Bordetella parapertussis PCR Not Detected     Chlamydophila pneumoniae PCR Not Detected     Mycoplasma pneumo by PCR Not Detected    Narrative:      Fact sheet for providers: https://docs.[employer]/wp-content/uploads/RII5965-3409-CC7.1-EUA-Provider-Fact-Sheet-3.pdf    Fact sheet for patients: https://docs.[employer]/wp-content/uploads/YNK4398-2824-DY6.1-EUA-Patient-Fact-Sheet-1.pdf          Ct Abdomen Pelvis Without Contrast    Result Date: 11/8/2020  Impression: 1.No acute  process identified within abdomen/pelivs. 2.Within the visualized lung bases are a couple scattered groundglass densities, suggesting a mild infectious or inflammatory process.    Electronically Signed By-DR. Rodney Escudero MD On:11/8/2020 4:01 PM This report was finalized on [id number] by DR. Rodney Escudero MD.    Xr Chest 1 View    Result Date: 11/8/2020  Impression: No acute cardiopulmonary process identified.  Electronically Signed By-DR. Rodney Escudero MD On:11/8/2020 1:43 PM This report was finalized on [id number] by DR. Rodney Escudero MD.                             Test Results Pending at Discharge  Pending Labs     Order Current Status    Blood Culture - Blood, Arm, Left Preliminary result    Blood Culture - Blood, Arm, Right Preliminary result            Procedures Performed           Consults:   Consults     Date and Time Order Name Status Description    11/8/2020 1646 Hospitalist (on-call MD unless specified) Completed             Discharge Details        Discharge Medications      New Medications      Instructions Start Date   albuterol sulfate  (90 Base) MCG/ACT inhaler  Commonly known as: PROVENTIL HFA;VENTOLIN HFA;PROAIR HFA   2 puffs, Inhalation, Every 4 Hours PRN      dexamethasone 6 MG tablet  Commonly known as: DECADRON   6 mg, Oral, Daily With Breakfast   Start Date: November 12, 2020     guaiFENesin 600 MG 12 hr tablet  Commonly known as: MUCINEX   1,200 mg, Oral, Every 12 Hours Scheduled         Continue These Medications      Instructions Start Date   aspirin 81 MG chewable tablet   81 mg, Oral, Daily      atorvastatin 40 MG tablet  Commonly known as: LIPITOR   40 mg, Oral, Daily      budesonide-formoterol 160-4.5 MCG/ACT inhaler  Commonly known as: SYMBICORT   2 puffs, Inhalation, 2 Times Daily - RT      cloNIDine 0.1 MG tablet  Commonly known as: CATAPRES   0.1 mg, Oral, Daily      metFORMIN  MG 24 hr tablet  Commonly known as: GLUCOPHAGE-XR   1,000 mg, Oral,  Daily With Breakfast      omeprazole 40 MG capsule  Commonly known as: priLOSEC   40 mg, Oral, Daily      ondansetron ODT 4 MG disintegrating tablet  Commonly known as: ZOFRAN-ODT   4 mg, Translingual, Every 8 Hours PRN         Stop These Medications    azithromycin 250 MG tablet  Commonly known as: ZITHROMAX     candesartan 32 MG tablet  Commonly known as: ATACAND     hydroCHLOROthiazide 25 MG tablet  Commonly known as: HYDRODIURIL            Allergies   Allergen Reactions   • Sulfa Antibiotics Anaphylaxis         Discharge Disposition:  Home or Self Care    Diet:  Hospital:  Diet Order   Procedures   • Diet Diabetic/Consistent Carbs; Diabetic - Consistent Carb         Discharge Activity:   Activity Instructions     Gradually Increase Activity Until at Pre-Hospitalization Level              CODE STATUS:    Code Status and Medical Interventions:   Ordered at: 11/08/20 2126     Level Of Support Discussed With:    Patient     Code Status:    CPR     Medical Interventions (Level of Support Prior to Arrest):    Full         Follow-up Appointments  No future appointments.    Additional Instructions for the Follow-ups that You Need to Schedule     Discharge Follow-up with PCP   As directed       Currently Documented PCP:    Luisa Saavedra APRN    PCP Phone Number:    [phone]     Follow Up Details: Follow-up with PCP next week for blood pressure check                 Condition on Discharge:      Stable      This patient has been examined with appropriate PPE . 11/11/20      Electronically signed by Martin Morris MD, 11/11/20, 9:46 AM EST.      Time: I spent  33 minutes on this discharge activity which included face-to-face encounter with the patient/reviewing the data in the system/coordination of the care with the nursing staff as well as consultants/documentation/entering orders.

## 2020-11-12 ENCOUNTER — READMISSION MANAGEMENT (OUTPATIENT)
Dept: CALL CENTER | Facility: HOSPITAL | Age: 70
End: 2020-11-12

## 2020-11-12 NOTE — OUTREACH NOTE
Prep Survey      Responses   [facility]   Is LACE score < 7 ?  No   Eligibility  Readm Mgmt   Discharge diagnosis  COVID-19 virus infection    Does the patient have one of the following disease processes/diagnoses(primary or secondary)?  COVID-19   Does the patient have Home health ordered?  No   Is there a DME ordered?  No   Prep survey completed?  Yes          Luciana Adams RN

## 2020-11-12 NOTE — OUTREACH NOTE
COVID-19 Week 1 Survey      Responses   [facility] patient discharged from?  [facility]   Does the patient have one of the following disease processes/diagnoses(primary or secondary)?  COVID-19   COVID-19 underlying condition?  None   Call Number  Call 1   Week 1 Call successful?  Yes   Call start time  0826   Call end time  0828   Is patient permission given to speak with other caregiver?  No   Meds reviewed with patient/caregiver?  Yes   Is the patient having any side effects they believe may be caused by any medication additions or changes?  No   Does the patient have all medications ordered at discharge?  Yes   Is the patient taking all medications as directed (includes completed medication regime)?  Yes   Does the patient have a primary care provider?   Yes   Does the patient have an appointment with their PCP or specialist within 7 days of discharge?  Yes   Has the patient kept scheduled appointments due by today?  N/A   Psychosocial issues?  No   Did the patient receive a copy of their discharge instructions?  Yes   Did the patient receive a copy of COVID-19 specific instructions?  Yes   Nursing interventions  Reviewed instructions with patient   What is the patient's perception of their health status since discharge?  Improving   Does the patient have any of the following symptoms?  Cough, Shortness of breath   Nursing Interventions  Nurse provided patient education   Pulse Ox monitoring  Intermittent   Pulse Ox device source  Patient   O2 Sat comments  has not checked today, but is doing good   O2 Sat: education provided  Sat levels   Is the patient/caregiver able to teach back steps to recovery at home?  Set small, achievable goals for return to baseline health, Rest and rebuild strength, gradually increase activity   If the patient is a current smoker, are they able to teach back resources for cessation?  Not a smoker   Is the patient/caregiver able to teach back the hierarchy of who to call/visit for  symptoms/problems? PCP, Specialist, Home health nurse, Urgent Care, ED, 911  Yes   COVID-19 call completed?  Yes          Concepcion Nam RN

## 2020-11-13 ENCOUNTER — READMISSION MANAGEMENT (OUTPATIENT)
Dept: CALL CENTER | Facility: HOSPITAL | Age: 70
End: 2020-11-13

## 2020-11-13 LAB
BACTERIA SPEC AEROBE CULT: NORMAL
BACTERIA SPEC AEROBE CULT: NORMAL

## 2020-11-13 NOTE — OUTREACH NOTE
COVID-19 Week 1 Survey      Responses   [facility] patient discharged from?  [facility]   Does the patient have one of the following disease processes/diagnoses(primary or secondary)?  COVID-19   COVID-19 underlying condition?  None   Call Number  Call 2   Week 1 Call successful?  Yes   Call start time  1200   Call end time  1208   Discharge diagnosis  COVID-19 virus infection    Meds reviewed with patient/caregiver?  Yes   Is the patient having any side effects they believe may be caused by any medication additions or changes?  No   Does the patient have all medications ordered at discharge?  Yes   Is the patient taking all medications as directed (includes completed medication regime)?  Yes   Does the patient have a primary care provider?   Yes   Does the patient have an appointment with their PCP or specialist within 7 days of discharge?  Yes   Has the patient kept scheduled appointments due by today?  N/A   Comments  Encouraged f/u appts w/ PCP and Pulmonologist.   Has home health visited the patient within 72 hours of discharge?  N/A   Psychosocial issues?  No   Did the patient receive a copy of their discharge instructions?  Yes   Did the patient receive a copy of COVID-19 specific instructions?  Yes   Nursing interventions  Reviewed instructions with patient   What is the patient's perception of their health status since discharge?  Improving   Nursing Interventions  Nurse provided patient education   Pulse Ox monitoring  Intermittent   Pulse Ox device source  Patient   O2 Sat comments  RA and 94% today.   O2 Sat: education provided  Sat levels, Monitoring frequency, When to seek care   O2 Sat education comments  If 92% and below, call an ambulance.   Is the patient/caregiver able to teach back steps to recovery at home?  Make a list of questions for provider's appointment, Eat a well-balance diet, Set small, achievable goals for return to baseline health, Rest and rebuild strength, gradually increase  activity   If the patient is a current smoker, are they able to teach back resources for cessation?  Not a smoker   Is the patient/caregiver able to teach back the hierarchy of who to call/visit for symptoms/problems? PCP, Specialist, Home health nurse, Urgent Care, ED, 911  Yes   COVID-19 call completed?  Yes   Wrap up additional comments  Encouraged deep breathing, changing toothbrush, and chapstick. Discussed fatigue.          Ana Tidwell RN

## 2020-11-14 ENCOUNTER — READMISSION MANAGEMENT (OUTPATIENT)
Dept: CALL CENTER | Facility: HOSPITAL | Age: 70
End: 2020-11-14

## 2020-11-14 NOTE — OUTREACH NOTE
COVID-19 Week 1 Survey      Responses   [facility] patient discharged from?  [facility]   Does the patient have one of the following disease processes/diagnoses(primary or secondary)?  COVID-19   COVID-19 underlying condition?  None   Call Number  Call 3   Week 1 Call successful?  No   Discharge diagnosis  COVID-19 virus infection           Mary Jose RN

## 2020-11-17 ENCOUNTER — READMISSION MANAGEMENT (OUTPATIENT)
Dept: CALL CENTER | Facility: HOSPITAL | Age: 70
End: 2020-11-17

## 2020-11-17 NOTE — OUTREACH NOTE
COVID-19 Week 1 Survey      Responses   [facility] patient discharged from?  [facility]   Does the patient have one of the following disease processes/diagnoses(primary or secondary)?  COVID-19   COVID-19 underlying condition?  None   Call Number  Call 4   Week 1 Call successful?  Yes   Call start time  1251   Call end time  1252   Discharge diagnosis  COVID-19 virus infection    Meds reviewed with patient/caregiver?  Yes   Is the patient taking all medications as directed (includes completed medication regime)?  Yes   Comments regarding PCP  PCP appt 11/19/20   Has the patient kept scheduled appointments due by today?  N/A   What is the patient's perception of their health status since discharge?  Improving   Does the patient have any of the following symptoms?  Cough, Shortness of breath [Cough and SOB are improving ]   Nursing Interventions  Nurse provided patient education   Pulse Ox monitoring  Intermittent   Pulse Ox device source  Patient   O2 Sat comments  96% on RA    COVID-19 call completed?  Yes          Sarah Jules RN

## 2020-11-20 ENCOUNTER — READMISSION MANAGEMENT (OUTPATIENT)
Dept: CALL CENTER | Facility: HOSPITAL | Age: 70
End: 2020-11-20

## 2020-11-20 NOTE — OUTREACH NOTE
"COVID-19 Week 2 Survey      Responses   [facility] [place] patient discharged from?  [facility]   Does the patient have one of the following disease processes/diagnoses(primary or secondary)?  COVID-19   COVID-19 underlying condition?  None   Call Number  Call 1   COVID-19 Week 2: Call 1 attempt successful?  Yes   Call start time  1231   Call end time  1233   Discharge diagnosis  COVID-19 virus infection    Meds reviewed with patient/caregiver?  Yes   Is the patient taking all medications as directed (includes completed medication regime)?  Yes   Has the patient kept scheduled appointments due by today?  Yes   What is the patient's perception of their health status since discharge?  Improving   Does the patient have any of the following symptoms?  Cough [\"little bit of cough\"]   Nursing Interventions  Nurse provided patient education   Pulse Ox monitoring  Intermittent   Pulse Ox device source  Patient   COVID-19 call completed?  Yes          Sarah Jules RN  "

## 2020-11-23 ENCOUNTER — READMISSION MANAGEMENT (OUTPATIENT)
Dept: CALL CENTER | Facility: HOSPITAL | Age: 70
End: 2020-11-23

## 2020-11-23 NOTE — OUTREACH NOTE
COVID-19 Week 2 Survey      Responses   [facility] patient discharged from?  [facility]   Does the patient have one of the following disease processes/diagnoses(primary or secondary)?  COVID-19   COVID-19 underlying condition?  None   Call Number  Call 2   COVID-19 Week 2: Call 1 attempt successful?  No          Keira Hannah RN

## 2020-11-30 ENCOUNTER — READMISSION MANAGEMENT (OUTPATIENT)
Dept: CALL CENTER | Facility: HOSPITAL | Age: 70
End: 2020-11-30

## 2020-11-30 NOTE — OUTREACH NOTE
COVID-19 Week 3 Survey      Responses   [facility] patient discharged from?  [facility]   Does the patient have one of the following disease processes/diagnoses(primary or secondary)?  COVID-19   COVID-19 underlying condition?  None   Call Number  Call 1   COVID-19 Week 3: Call 1 attempt successful?  No   Discharge diagnosis  COVID-19 virus infection           Concepcion Nam RN

## 2020-12-07 ENCOUNTER — READMISSION MANAGEMENT (OUTPATIENT)
Dept: CALL CENTER | Facility: HOSPITAL | Age: 70
End: 2020-12-07

## 2020-12-07 NOTE — OUTREACH NOTE
COVID-19 Week 4 Survey      Responses   [facility] [place] patient discharged from?  [facility]   Does the patient have one of the following disease processes/diagnoses(primary or secondary)?  COVID-19   COVID-19 underlying condition?  None   Call Number  Call 1   COVID-19 Week 4: Call 1 attempt successful?  No   Discharge diagnosis  COVID-19 virus infection           Sis Sullivan RN

## 2021-10-18 ENCOUNTER — TRANSCRIBE ORDERS (OUTPATIENT)
Dept: ADMINISTRATIVE | Facility: HOSPITAL | Age: 71
End: 2021-10-18

## 2021-10-18 DIAGNOSIS — R91.1 PULMONARY NODULE/LESION, SOLITARY: Primary | ICD-10-CM

## 2021-11-01 ENCOUNTER — APPOINTMENT (OUTPATIENT)
Dept: PET IMAGING | Facility: HOSPITAL | Age: 71
End: 2021-11-01